# Patient Record
Sex: FEMALE | Employment: FULL TIME | URBAN - METROPOLITAN AREA
[De-identification: names, ages, dates, MRNs, and addresses within clinical notes are randomized per-mention and may not be internally consistent; named-entity substitution may affect disease eponyms.]

---

## 2023-05-22 ENCOUNTER — TELEPHONE (OUTPATIENT)
Dept: ENT CLINIC | Age: 33
End: 2023-05-22

## 2023-05-22 NOTE — TELEPHONE ENCOUNTER
Patients  calls into the office very upset that his wifes' appointment today was cancelled and previous appointment rescheduled. He states that no one called him or his wife to advise that this appointment was going to be rescheduled. He states that his wife came to the office today and this was the first they had heard of it. He states that he does not wish to reschedule his appointments with this office. Gave him the number for the [facility] location so that he may call. He would appreciate a return call from [address] to discuss this.

## 2023-05-25 NOTE — TELEPHONE ENCOUNTER
I called and spoke to Julian Mohr. She states that she did not receive a call to cancel or reschedule her last appointment. The staff noted that they had called her number twice to reschedule. I apologized for the miscommunication. She stated that she is seeing another ENT.

## 2023-06-12 DIAGNOSIS — H91.90 HEARING LOSS, UNSPECIFIED HEARING LOSS TYPE, UNSPECIFIED LATERALITY: Primary | ICD-10-CM

## 2024-01-08 ENCOUNTER — PROCEDURE VISIT (OUTPATIENT)
Dept: ENT CLINIC | Age: 34
End: 2024-01-08
Payer: COMMERCIAL

## 2024-01-08 VITALS
WEIGHT: 133.4 LBS | BODY MASS INDEX: 22.77 KG/M2 | TEMPERATURE: 97.1 F | DIASTOLIC BLOOD PRESSURE: 69 MMHG | SYSTOLIC BLOOD PRESSURE: 116 MMHG | HEART RATE: 82 BPM | HEIGHT: 64 IN

## 2024-01-08 DIAGNOSIS — L91.0 KELOID OF SKIN: Primary | ICD-10-CM

## 2024-01-08 DIAGNOSIS — D23.22: ICD-10-CM

## 2024-01-08 PROCEDURE — 11900 INJECT SKIN LESIONS </W 7: CPT | Performed by: OTOLARYNGOLOGY

## 2024-01-08 PROCEDURE — 11442 EXC FACE-MM B9+MARG 1.1-2 CM: CPT | Performed by: OTOLARYNGOLOGY

## 2024-01-08 NOTE — PROGRESS NOTES
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    POSTOPERATIVE DIAGNOSIS: Same    PROCEDURE: Excision keloid of the helix of the left external ear.  Triamcinolone injection into the operative site.    SURGEON:  Jeaneth Pocatello    ANAESTHESIA: Lidocaine 1% with epinephrine 1: 100,000.    FINDINGS: The left ear was prepped with Betadine.  The skin of the helix of the left external ear was injected with local anesthetic.  The keloid was removed using a 15 blade as well as the scissors and an Adson's tissue forceps.  The keloid size was 1.8 cm.A small amount of keloid tissue was left present on the helix.  The excision site was injected with triamcinolone 40 mg/mL.  No sutures were placed.    FOLLOW UP: 1 month.

## 2024-01-22 ENCOUNTER — OFFICE VISIT (OUTPATIENT)
Dept: ENT CLINIC | Age: 34
End: 2024-01-22

## 2024-01-22 VITALS
TEMPERATURE: 97.5 F | HEIGHT: 64 IN | DIASTOLIC BLOOD PRESSURE: 64 MMHG | HEART RATE: 87 BPM | RESPIRATION RATE: 16 BRPM | SYSTOLIC BLOOD PRESSURE: 122 MMHG | BODY MASS INDEX: 23.22 KG/M2 | WEIGHT: 136 LBS

## 2024-01-22 DIAGNOSIS — L91.0 KELOID OF SKIN: Primary | ICD-10-CM

## 2024-01-22 DIAGNOSIS — D23.22: ICD-10-CM

## 2024-01-22 PROCEDURE — 99024 POSTOP FOLLOW-UP VISIT: CPT | Performed by: OTOLARYNGOLOGY

## 2024-01-22 NOTE — PROGRESS NOTES
2 weeks following excision keloid of the helix of the left ear.  I left a small amount of keloid present and injected with triamcinolone.  The skin has healed well by secondary intention and there is minimal residual discomfort or swelling.  The patient will return in 2 weeks for another triamcinolone injection.

## 2024-02-07 ENCOUNTER — OFFICE VISIT (OUTPATIENT)
Dept: ENT CLINIC | Age: 34
End: 2024-02-07
Payer: COMMERCIAL

## 2024-02-07 VITALS
TEMPERATURE: 97.8 F | DIASTOLIC BLOOD PRESSURE: 60 MMHG | BODY MASS INDEX: 22.88 KG/M2 | HEIGHT: 64 IN | SYSTOLIC BLOOD PRESSURE: 107 MMHG | WEIGHT: 134 LBS | HEART RATE: 73 BPM

## 2024-02-07 DIAGNOSIS — L91.0 KELOID OF SKIN: Primary | ICD-10-CM

## 2024-02-07 PROCEDURE — 99212 OFFICE O/P EST SF 10 MIN: CPT | Performed by: OTOLARYNGOLOGY

## 2024-02-07 NOTE — PROGRESS NOTES
1 month following excision of keloid of the helix of the left ear.  The ear is completely healed, the patient's pain is resolved.  Follow-up: 3 months.

## 2024-05-06 ENCOUNTER — OFFICE VISIT (OUTPATIENT)
Dept: ENT CLINIC | Age: 34
End: 2024-05-06
Payer: COMMERCIAL

## 2024-05-06 VITALS
TEMPERATURE: 97.5 F | WEIGHT: 135.4 LBS | HEIGHT: 64 IN | DIASTOLIC BLOOD PRESSURE: 61 MMHG | SYSTOLIC BLOOD PRESSURE: 105 MMHG | HEART RATE: 64 BPM | BODY MASS INDEX: 23.12 KG/M2

## 2024-05-06 DIAGNOSIS — L91.0 KELOID OF SKIN: Primary | ICD-10-CM

## 2024-05-06 DIAGNOSIS — D23.22: ICD-10-CM

## 2024-05-06 PROCEDURE — 99024 POSTOP FOLLOW-UP VISIT: CPT | Performed by: OTOLARYNGOLOGY

## 2024-05-06 PROCEDURE — 11900 INJECT SKIN LESIONS </W 7: CPT | Performed by: OTOLARYNGOLOGY

## 2024-05-06 RX ORDER — TRIAMCINOLONE ACETONIDE 40 MG/ML
40 INJECTION, SUSPENSION INTRA-ARTICULAR; INTRAMUSCULAR ONCE
Status: COMPLETED | OUTPATIENT
Start: 2024-05-06 | End: 2024-05-06

## 2024-05-06 RX ADMIN — TRIAMCINOLONE ACETONIDE 40 MG: 40 INJECTION, SUSPENSION INTRA-ARTICULAR; INTRAMUSCULAR at 11:33

## 2024-05-06 NOTE — PROGRESS NOTES
Excision keloid of the superior helix of the left external ear a little over 3 months ago.  Last seen 1 month postop.  The skin and has had healed well and the patient had no pain.  She continues to do well.  The skin is healed and there is no pain.  I detect a slight amount of keloid regrowth and after discussion with patient we have agreed to inject triamcinolone into the keloid.  1 cm of Kenalog 40 mg/mL injected into the keloid.  Follow-up: 3 months.

## 2024-08-20 ENCOUNTER — OFFICE VISIT (OUTPATIENT)
Dept: OBGYN CLINIC | Age: 34
End: 2024-08-20
Payer: COMMERCIAL

## 2024-08-20 VITALS
SYSTOLIC BLOOD PRESSURE: 120 MMHG | WEIGHT: 128.6 LBS | HEART RATE: 101 BPM | DIASTOLIC BLOOD PRESSURE: 70 MMHG | BODY MASS INDEX: 22.07 KG/M2

## 2024-08-20 DIAGNOSIS — F12.90 MARIJUANA USE: ICD-10-CM

## 2024-08-20 DIAGNOSIS — Z32.01 POSITIVE PREGNANCY TEST: ICD-10-CM

## 2024-08-20 DIAGNOSIS — N91.2 AMENORRHEA: ICD-10-CM

## 2024-08-20 DIAGNOSIS — R11.0 NAUSEA: ICD-10-CM

## 2024-08-20 DIAGNOSIS — Z01.419 WOMEN'S ANNUAL ROUTINE GYNECOLOGICAL EXAMINATION: Primary | ICD-10-CM

## 2024-08-20 LAB
CONTROL: ABNORMAL
PREGNANCY TEST URINE, POC: POSITIVE

## 2024-08-20 PROCEDURE — 99385 PREV VISIT NEW AGE 18-39: CPT | Performed by: OBSTETRICS & GYNECOLOGY

## 2024-08-20 PROCEDURE — 81025 URINE PREGNANCY TEST: CPT | Performed by: OBSTETRICS & GYNECOLOGY

## 2024-08-20 ASSESSMENT — ENCOUNTER SYMPTOMS
NAUSEA: 1
PHOTOPHOBIA: 0
ABDOMINAL PAIN: 0
COLOR CHANGE: 0
SHORTNESS OF BREATH: 0
VOMITING: 0

## 2024-08-20 NOTE — PROGRESS NOTES
[facility]   Annual Exam     CC:   Chief Complaint   Patient presents with    New Patient    Amenorrhea     LMP 24  Chills all the time.        HPI:  34 y.o.  presents for her gynecologic exam.   New to office. She complains of Amenorrhea.   Patient's last menstrual period was 2024.  Has had a (+) home pregnancy test.   Denies any VB / Cramping since that time.   Some nausea/vomiting } declined meds for now.   Doing well prenatal vitamin.   Medical Hx significant for None. Pt admits to hx of opiate abuse. Uses THC now. Has quit smoking. Denies Hx of GYN surgeries.   Previous pregnancies: G1: Normal /  + Episiotomy, G2: Normal / , Did have hx of THC. G3: elective AB.       Screening:  Last pap smear: thinks last yr, normal   History of abnormal pap smears: denies    Denies   STI History: Denies      Menstruation Hx:   Menarche: 11-14   Cycle: 4-7d / 30d   Flow: heavy then normal   Pain: Y -- Ibuprofen + THC helps + HP      Review of Systems:   Review of Systems   Constitutional:  Negative for activity change, appetite change and fatigue.   Eyes:  Negative for photophobia and visual disturbance.   Respiratory:  Negative for shortness of breath.    Cardiovascular:  Negative for chest pain, palpitations and leg swelling.   Gastrointestinal:  Positive for nausea. Negative for abdominal pain and vomiting.   Genitourinary:  Negative for difficulty urinating.        (+) absence of Menses   (+) breast tenderness    Skin:  Negative for color change.   Neurological:  Negative for dizziness and numbness.   Hematological:  Negative for adenopathy. Does not bruise/bleed easily.   Psychiatric/Behavioral:  Negative for behavioral problems. The patient is not nervous/anxious.        Primary Care Physician: Cammy Elias DO    Obstetric History  OB History    Para Term  AB Living   3       1 2   SAB IAB Ectopic Molar Multiple Live Births             2      # Outcome Date GA Lbr Jerod/2nd

## 2024-08-21 LAB
BACTERIA UR CULT: NORMAL
BACTERIA URNS QL MICRO: ABNORMAL /HPF
BILIRUB UR QL STRIP.AUTO: NEGATIVE
CLARITY UR: CLEAR
COLOR UR: YELLOW
EPI CELLS #/AREA URNS AUTO: 6 /HPF (ref 0–5)
GLUCOSE UR STRIP.AUTO-MCNC: NEGATIVE MG/DL
HGB UR QL STRIP.AUTO: NEGATIVE
HYALINE CASTS #/AREA URNS AUTO: 0 /LPF (ref 0–8)
KETONES UR STRIP.AUTO-MCNC: NEGATIVE MG/DL
LEUKOCYTE ESTERASE UR QL STRIP.AUTO: NEGATIVE
NITRITE UR QL STRIP.AUTO: NEGATIVE
PH UR STRIP.AUTO: 5.5 [PH] (ref 5–8)
PROT UR STRIP.AUTO-MCNC: NEGATIVE MG/DL
RBC CLUMPS #/AREA URNS AUTO: 1 /HPF (ref 0–4)
SP GR UR STRIP.AUTO: 1.02 (ref 1–1.03)
UA DIPSTICK W REFLEX MICRO PNL UR: ABNORMAL
URN SPEC COLLECT METH UR: ABNORMAL
UROBILINOGEN UR STRIP-ACNC: 0.2 E.U./DL
WBC #/AREA URNS AUTO: 2 /HPF (ref 0–5)

## 2024-09-20 ENCOUNTER — INITIAL PRENATAL (OUTPATIENT)
Dept: OBGYN CLINIC | Age: 34
End: 2024-09-20
Payer: COMMERCIAL

## 2024-09-20 VITALS
WEIGHT: 132.8 LBS | DIASTOLIC BLOOD PRESSURE: 60 MMHG | SYSTOLIC BLOOD PRESSURE: 108 MMHG | BODY MASS INDEX: 22.8 KG/M2 | HEART RATE: 81 BPM

## 2024-09-20 DIAGNOSIS — O99.320 MARIJUANA USE DURING PREGNANCY: ICD-10-CM

## 2024-09-20 DIAGNOSIS — Z34.81 PRENATAL CARE, SUBSEQUENT PREGNANCY IN FIRST TRIMESTER: Primary | ICD-10-CM

## 2024-09-20 DIAGNOSIS — O21.9 NAUSEA AND VOMITING IN PREGNANCY: ICD-10-CM

## 2024-09-20 DIAGNOSIS — F12.90 MARIJUANA USE DURING PREGNANCY: ICD-10-CM

## 2024-09-20 DIAGNOSIS — Z87.891 RECENTLY QUIT USING TOBACCO: ICD-10-CM

## 2024-09-20 DIAGNOSIS — Z91.89 AT RISK FOR ABUSE OF OPIATES: ICD-10-CM

## 2024-09-20 LAB
HEP B, EXTERNAL RESULT: NEGATIVE
HIV, EXTERNAL RESULT: NEGATIVE
RUBELLA TITER, EXTERNAL RESULT: NORMAL

## 2024-09-20 PROCEDURE — 36415 COLL VENOUS BLD VENIPUNCTURE: CPT | Performed by: OBSTETRICS & GYNECOLOGY

## 2024-09-20 PROCEDURE — 0502F SUBSEQUENT PRENATAL CARE: CPT | Performed by: OBSTETRICS & GYNECOLOGY

## 2024-09-21 LAB
ABO + RH BLD: NORMAL
AMPHETAMINES UR QL SCN>1000 NG/ML: ABNORMAL
BARBITURATES UR QL SCN>200 NG/ML: ABNORMAL
BASOPHILS # BLD: 0.1 K/UL (ref 0–0.2)
BASOPHILS NFR BLD: 0.6 %
BENZODIAZ UR QL SCN>200 NG/ML: ABNORMAL
BLD GP AB SCN SERPL QL: NORMAL
BUPRENORPHINE+NOR UR QL SCN: ABNORMAL
CANNABINOIDS UR QL SCN>50 NG/ML: POSITIVE
COCAINE UR QL SCN: ABNORMAL
DEPRECATED RDW RBC AUTO: 15.3 % (ref 12.4–15.4)
DRUG SCREEN COMMENT UR-IMP: ABNORMAL
EOSINOPHIL # BLD: 0.7 K/UL (ref 0–0.6)
EOSINOPHIL NFR BLD: 7.4 %
FENTANYL SCREEN, URINE: ABNORMAL
HBV SURFACE AG SERPL QL IA: NORMAL
HCT VFR BLD AUTO: 36.6 % (ref 36–48)
HGB BLD-MCNC: 12.3 G/DL (ref 12–16)
LYMPHOCYTES # BLD: 1.9 K/UL (ref 1–5.1)
LYMPHOCYTES NFR BLD: 19.2 %
MCH RBC QN AUTO: 29.9 PG (ref 26–34)
MCHC RBC AUTO-ENTMCNC: 33.6 G/DL (ref 31–36)
MCV RBC AUTO: 89 FL (ref 80–100)
METHADONE UR QL SCN>300 NG/ML: ABNORMAL
MONOCYTES # BLD: 0.4 K/UL (ref 0–1.3)
MONOCYTES NFR BLD: 4.4 %
NEUTROPHILS # BLD: 6.8 K/UL (ref 1.7–7.7)
NEUTROPHILS NFR BLD: 68.4 %
OPIATES UR QL SCN>300 NG/ML: ABNORMAL
OXYCODONE UR QL SCN: ABNORMAL
PCP UR QL SCN>25 NG/ML: ABNORMAL
PH UR STRIP: 6 [PH]
PLATELET # BLD AUTO: 282 K/UL (ref 135–450)
PMV BLD AUTO: 8.7 FL (ref 5–10.5)
RBC # BLD AUTO: 4.11 M/UL (ref 4–5.2)
RUBV IGG SERPL IA-ACNC: 99.5 IU/ML
TSH SERPL DL<=0.005 MIU/L-ACNC: 1.19 UIU/ML (ref 0.27–4.2)
WBC # BLD AUTO: 9.9 K/UL (ref 4–11)

## 2024-09-22 LAB — VZV IGG SER QL IA: NORMAL

## 2024-09-23 LAB
HCV AB S/CO SERPL IA: 0.14 IV
HCV AB SERPL QL IA: NEGATIVE

## 2024-09-24 LAB
HIV 1+2 AB+HIV1 P24 AG SERPL QL IA: NORMAL
HIV 2 AB SERPL QL IA: NORMAL
HIV1 AB SERPL QL IA: NORMAL
HIV1 P24 AG SERPL QL IA: NORMAL
T PALLIDUM IGG SER QL IF: NON REACTIVE

## 2024-09-30 PROBLEM — Z34.81 PRENATAL CARE, SUBSEQUENT PREGNANCY IN FIRST TRIMESTER: Status: ACTIVE | Noted: 2024-09-30

## 2024-09-30 PROBLEM — Z91.89 AT RISK FOR ABUSE OF OPIATES: Status: ACTIVE | Noted: 2024-09-30

## 2024-09-30 PROBLEM — Z87.891 RECENTLY QUIT USING TOBACCO: Status: ACTIVE | Noted: 2024-09-30

## 2024-10-18 ENCOUNTER — ROUTINE PRENATAL (OUTPATIENT)
Dept: OBGYN CLINIC | Age: 34
End: 2024-10-18

## 2024-10-18 VITALS
BODY MASS INDEX: 23.72 KG/M2 | OXYGEN SATURATION: 99 % | WEIGHT: 138.2 LBS | DIASTOLIC BLOOD PRESSURE: 64 MMHG | HEART RATE: 58 BPM | SYSTOLIC BLOOD PRESSURE: 118 MMHG

## 2024-10-18 DIAGNOSIS — F12.90 MARIJUANA USE DURING PREGNANCY: ICD-10-CM

## 2024-10-18 DIAGNOSIS — Z34.82 PRENATAL CARE, SUBSEQUENT PREGNANCY IN SECOND TRIMESTER: Primary | ICD-10-CM

## 2024-10-18 DIAGNOSIS — Z87.891 RECENTLY QUIT USING TOBACCO: ICD-10-CM

## 2024-10-18 DIAGNOSIS — O99.320 MARIJUANA USE DURING PREGNANCY: ICD-10-CM

## 2024-10-18 DIAGNOSIS — O21.9 NAUSEA AND VOMITING IN PREGNANCY: ICD-10-CM

## 2024-10-18 PROCEDURE — 0502F SUBSEQUENT PRENATAL CARE: CPT | Performed by: OBSTETRICS & GYNECOLOGY

## 2024-10-18 NOTE — PROGRESS NOTES
34 y.o.  at 16w4d EGA Estimated Date of Delivery: 3/31/25 here for NETTIE Visit:     Pt seen and examined.   Reviewed all questions and concerns   Denies VB, LOF, CTX. Denies FM yet.   Denies fevers / chills / chest pain / shortness of breath.   Denies HA, changes with vision, RUQ pain, edema.   MWQ reviewed - doing well no concerns.     Objective:  /64 (Site: Right Upper Arm, Position: Sitting, Cuff Size: Medium Adult)   Pulse 58   Wt 62.7 kg (138 lb 3.2 oz)   LMP 2024   SpO2 99%   BMI 23.72 kg/m²   Gen: AO, NAD  Abd: Soft, NT, gravid    FHT: 145 bpm  Ext: Mild LE edema  OMM: Increased lumbar lordosis    Assessment/Plan:   Diagnosis Orders   1. Prenatal care, subsequent pregnancy in second trimester        2. Marijuana use during pregnancy        3. Nausea and vomiting in pregnancy        4. Recently quit using tobacco             Prenatal Care, FIRST pregnancy   Reassuring fetal / maternal status today  Aneuploidy / Carrier Screen:  Low Risk, Female   AFP: declined    PNL: A(+)/ab(-), RI, HepBnr, HepCnr, HIVnr, RPRnr, Varicella Imm, TSH 1.19, Hgb 12.3, Plt 282, UDS (+) THC, UCx neg, GCCT NEED      Anatomy:   28 wk: GCT  Hgb Plt   Tdap: at 28 wks    GBS: 35-37 wks   Birth Plan:    Breastfeeding Education:     Marijuana Use   Currently helping nausea   Recommend quitting in pregnancy   Will retest on LD at delivery, may trigger social work consult   Plan for Growth US around 36 wks     Hx Opiate use   Has been clean for several years, not on maintenance medication   No concerns currently  UDS only + for THC     Follow Up  Return OB precautions reviewed   Return in about 4 weeks (around 11/15/2024) for Return OB visit, Ultrasound.    Izabella Barcenas DO

## 2024-10-29 PROBLEM — O99.320 MARIJUANA USE DURING PREGNANCY: Status: ACTIVE | Noted: 2024-08-20

## 2024-10-29 PROBLEM — O21.9 NAUSEA AND VOMITING IN PREGNANCY: Status: ACTIVE | Noted: 2024-10-29

## 2024-11-12 ENCOUNTER — ROUTINE PRENATAL (OUTPATIENT)
Dept: OBGYN CLINIC | Age: 34
End: 2024-11-12

## 2024-11-12 VITALS
WEIGHT: 144.8 LBS | DIASTOLIC BLOOD PRESSURE: 60 MMHG | SYSTOLIC BLOOD PRESSURE: 110 MMHG | BODY MASS INDEX: 24.85 KG/M2 | HEART RATE: 85 BPM

## 2024-11-12 DIAGNOSIS — Z36.9 ENCOUNTER FOR FETAL ULTRASOUND: ICD-10-CM

## 2024-11-12 DIAGNOSIS — Z91.89 AT RISK FOR ABUSE OF OPIATES: ICD-10-CM

## 2024-11-12 DIAGNOSIS — F12.90 MARIJUANA USE DURING PREGNANCY: ICD-10-CM

## 2024-11-12 DIAGNOSIS — N89.8 VAGINAL DISCHARGE DURING PREGNANCY IN SECOND TRIMESTER: ICD-10-CM

## 2024-11-12 DIAGNOSIS — Z87.891 RECENTLY QUIT USING TOBACCO: ICD-10-CM

## 2024-11-12 DIAGNOSIS — O26.892 VAGINAL DISCHARGE DURING PREGNANCY IN SECOND TRIMESTER: ICD-10-CM

## 2024-11-12 DIAGNOSIS — O21.9 NAUSEA AND VOMITING IN PREGNANCY: ICD-10-CM

## 2024-11-12 DIAGNOSIS — Z34.82 PRENATAL CARE, SUBSEQUENT PREGNANCY IN SECOND TRIMESTER: Primary | ICD-10-CM

## 2024-11-12 DIAGNOSIS — O99.320 MARIJUANA USE DURING PREGNANCY: ICD-10-CM

## 2024-11-12 PROCEDURE — 0502F SUBSEQUENT PRENATAL CARE: CPT | Performed by: OBSTETRICS & GYNECOLOGY

## 2024-11-12 RX ORDER — TERCONAZOLE 4 MG/G
CREAM VAGINAL
Qty: 45 G | Refills: 0 | Status: SHIPPED | OUTPATIENT
Start: 2024-11-12 | End: 2024-11-19

## 2024-11-12 NOTE — PROGRESS NOTES
34 y.o.  at 20w1d EGA Estimated Date of Delivery: 3/31/25 here for NETTIE Visit:     Pt seen and examined.   Does complain of some vaginal discharge, declines examination, would like to try terazole first, OK with rx   Reviewed all questions and concerns   Denies VB, LOF, CTX. Admits to normal fetal movement.   Denies fevers / chills / chest pain / shortness of breath.   Denies HA, changes with vision, RUQ pain, edema.   MWQ reviewed - doing well no concerns.     Pregnancy is complicated by 1) THC use, 2) Hx of opiate abuse, 3) nausea / emesis (improving).     Objective:  /60 (Site: Right Upper Arm, Position: Sitting, Cuff Size: Medium Adult)   Pulse 85   Wt 65.7 kg (144 lb 12.8 oz)   LMP 2024   BMI 24.85 kg/m²   Gen: AO, NAD  Abd: Soft, NT, gravid   Ext: Mild LE edema  OMM: Increased lumbar lordosis    Images:  OBSTETRIC ULTRASOUND -- SECOND TRIMESTER     DATE:  2024     PHYSICIAN: FLAKITO Barcenas D.O.     SONOGRAPHER: FREDY Kaminski MS     INDICATION:  Second trimester, Anatomical screening     TYPE OF SCAN: vaginal, abdominal 3.5 MHz 5MHz     FINDINGS:       A single viable intrauterine pregnancy is noted in cephalic presentation. Cardiac and somatic activity are noted.     The following values were obtained:              Fetal heart rate                                               151bpm              BPD                                                                 4.93cm                        79.8 %              Head Circumference                                       18.87cm                      84.4 %               Abdominal Circumference                              16.35cm                      82.8 %              Femur Length                                                  3.38cm                        59.0 %              Humerus Length                                             3.21cm                        75.4 %              Cerebellum

## 2024-11-18 PROBLEM — Z34.82 PRENATAL CARE, SUBSEQUENT PREGNANCY IN SECOND TRIMESTER: Status: ACTIVE | Noted: 2024-11-18

## 2024-11-18 PROBLEM — Z36.9 ENCOUNTER FOR FETAL ULTRASOUND: Status: ACTIVE | Noted: 2024-11-18

## 2024-11-18 PROBLEM — N89.8 VAGINAL DISCHARGE DURING PREGNANCY IN SECOND TRIMESTER: Status: ACTIVE | Noted: 2024-11-18

## 2024-11-18 PROBLEM — O26.892 VAGINAL DISCHARGE DURING PREGNANCY IN SECOND TRIMESTER: Status: ACTIVE | Noted: 2024-11-18

## 2024-12-10 ENCOUNTER — ROUTINE PRENATAL (OUTPATIENT)
Dept: OBGYN CLINIC | Age: 34
End: 2024-12-10

## 2024-12-10 VITALS
HEIGHT: 64 IN | TEMPERATURE: 97.8 F | SYSTOLIC BLOOD PRESSURE: 112 MMHG | WEIGHT: 147.4 LBS | BODY MASS INDEX: 25.16 KG/M2 | HEART RATE: 97 BPM | DIASTOLIC BLOOD PRESSURE: 64 MMHG | OXYGEN SATURATION: 98 %

## 2024-12-10 DIAGNOSIS — F12.90 MARIJUANA USE DURING PREGNANCY: ICD-10-CM

## 2024-12-10 DIAGNOSIS — Z34.82 PRENATAL CARE, SUBSEQUENT PREGNANCY IN SECOND TRIMESTER: Primary | ICD-10-CM

## 2024-12-10 DIAGNOSIS — O99.320 MARIJUANA USE DURING PREGNANCY: ICD-10-CM

## 2024-12-10 DIAGNOSIS — Z91.89 AT RISK FOR ABUSE OF OPIATES: ICD-10-CM

## 2024-12-10 PROCEDURE — 0502F SUBSEQUENT PRENATAL CARE: CPT | Performed by: OBSTETRICS & GYNECOLOGY

## 2024-12-10 NOTE — PROGRESS NOTES
Return OB Office Visit    CC:   Chief Complaint   Patient presents with    Routine Prenatal Visit       HPI:  Pt seen and examined. No concerns/complaints. Denies VB, LOF, ctx. +FM. Some cramps today, only lasted about 10-15 minutes.     Objective:  /64   Pulse 97   Temp 97.8 °F (36.6 °C) (Temporal)   Ht 1.626 m (5' 4\")   Wt 66.9 kg (147 lb 6.4 oz)   LMP 2024   SpO2 98%   BMI 25.30 kg/m²   Gen: AO, NAD  Abd: Soft, NT  FHT: 150  FH: 24cm, unk by Le[place]olds    Assessment/Plan:  34 y.o.  at 24w1d (Estimated Date of Delivery: 3/31/25) presents for NETTIE appointment:      Diagnosis Orders   1. Prenatal care, subsequent pregnancy in second trimester        2. Marijuana use during pregnancy        3. At risk for abuse of opiates          Doing well today, routine care  - FWB reassuring on doptones today  - GTT/CBC, Tdap next visit  - rec THC cessation, repeat on L&D  - h/o opiate use, clean for several years, no maintenance medications, no concerns today    Dispo: RTC in 4 weeks  Tabby Lagos MD

## 2025-01-07 ENCOUNTER — ROUTINE PRENATAL (OUTPATIENT)
Dept: OBGYN CLINIC | Age: 35
End: 2025-01-07
Payer: COMMERCIAL

## 2025-01-07 VITALS
WEIGHT: 153 LBS | DIASTOLIC BLOOD PRESSURE: 79 MMHG | SYSTOLIC BLOOD PRESSURE: 118 MMHG | BODY MASS INDEX: 26.26 KG/M2 | HEART RATE: 85 BPM

## 2025-01-07 DIAGNOSIS — F12.90 MARIJUANA USE DURING PREGNANCY: ICD-10-CM

## 2025-01-07 DIAGNOSIS — Z34.83 PRENATAL CARE, SUBSEQUENT PREGNANCY IN THIRD TRIMESTER: Primary | ICD-10-CM

## 2025-01-07 DIAGNOSIS — Z23 NEED FOR PERTUSSIS VACCINATION: ICD-10-CM

## 2025-01-07 DIAGNOSIS — O99.320 MARIJUANA USE DURING PREGNANCY: ICD-10-CM

## 2025-01-07 LAB
BASOPHILS # BLD: 0.1 K/UL (ref 0–0.2)
BASOPHILS NFR BLD: 0.5 %
DEPRECATED RDW RBC AUTO: 13.6 % (ref 12.4–15.4)
EOSINOPHIL # BLD: 0.4 K/UL (ref 0–0.6)
EOSINOPHIL NFR BLD: 3.8 %
HCT VFR BLD AUTO: 35.2 % (ref 36–48)
HGB BLD-MCNC: 11.8 G/DL (ref 12–16)
LYMPHOCYTES # BLD: 1.6 K/UL (ref 1–5.1)
LYMPHOCYTES NFR BLD: 16 %
MCH RBC QN AUTO: 30.5 PG (ref 26–34)
MCHC RBC AUTO-ENTMCNC: 33.5 G/DL (ref 31–36)
MCV RBC AUTO: 91 FL (ref 80–100)
MONOCYTES # BLD: 0.6 K/UL (ref 0–1.3)
MONOCYTES NFR BLD: 5.5 %
NEUTROPHILS # BLD: 7.5 K/UL (ref 1.7–7.7)
NEUTROPHILS NFR BLD: 74.2 %
PLATELET # BLD AUTO: 277 K/UL (ref 135–450)
PMV BLD AUTO: 7.9 FL (ref 5–10.5)
RBC # BLD AUTO: 3.87 M/UL (ref 4–5.2)
WBC # BLD AUTO: 10.1 K/UL (ref 4–11)

## 2025-01-07 PROCEDURE — 36415 COLL VENOUS BLD VENIPUNCTURE: CPT | Performed by: OBSTETRICS & GYNECOLOGY

## 2025-01-07 PROCEDURE — 90471 IMMUNIZATION ADMIN: CPT | Performed by: OBSTETRICS & GYNECOLOGY

## 2025-01-07 PROCEDURE — 0502F SUBSEQUENT PRENATAL CARE: CPT | Performed by: OBSTETRICS & GYNECOLOGY

## 2025-01-07 PROCEDURE — 90715 TDAP VACCINE 7 YRS/> IM: CPT | Performed by: OBSTETRICS & GYNECOLOGY

## 2025-01-07 NOTE — PROGRESS NOTES
34 y.o.  at 28w1d EGA Estimated Date of Delivery: 3/31/25 here for NETTIE:     Pt seen and examined. No concerns/complaints.  Denies VB, LOF, CTX. Endorses (+) FM. Denies fevers / chills / chest pain / shortness of breath.   Denies HA, changes with vision, RUQ pain, edema.     Prior  x 2 both at [facility], 1st was forcep delivery     Objective:  /79   Pulse 85   Wt 69.4 kg (153 lb)   LMP 2024   BMI 26.26 kg/m²   Gen: AO, NAD  Abd: Soft, NT, gravid   Ext: Mild LE edema    Assessment/Plan:   Diagnosis Orders   1. Prenatal care, subsequent pregnancy in third trimester        2. Marijuana use during pregnancy           -gct and cbc today   - tdap today   - rtc in 2 weeks   - ptl precautions   - reassuring maternal / fetal status     Follow Up  Return OB precautions reviewed   No follow-ups on file.    Approximately 10 minutes spent in room counseling patient on condition and coordination of care with over 50% in direct face to face counseling.     Ally Reyes, DO

## 2025-01-08 LAB — GLUCOSE 1H P 50 G GLC PO SERPL-MCNC: 115 MG/DL

## 2025-01-24 ENCOUNTER — ROUTINE PRENATAL (OUTPATIENT)
Dept: OBGYN CLINIC | Age: 35
End: 2025-01-24

## 2025-01-24 VITALS
DIASTOLIC BLOOD PRESSURE: 66 MMHG | TEMPERATURE: 98.2 F | BODY MASS INDEX: 26.88 KG/M2 | HEART RATE: 91 BPM | SYSTOLIC BLOOD PRESSURE: 108 MMHG | WEIGHT: 156.6 LBS

## 2025-01-24 DIAGNOSIS — Z34.83 PRENATAL CARE, SUBSEQUENT PREGNANCY IN THIRD TRIMESTER: Primary | ICD-10-CM

## 2025-01-24 DIAGNOSIS — F12.90 MARIJUANA USE DURING PREGNANCY: ICD-10-CM

## 2025-01-24 DIAGNOSIS — O21.9 NAUSEA AND VOMITING IN PREGNANCY: ICD-10-CM

## 2025-01-24 DIAGNOSIS — O99.320 MARIJUANA USE DURING PREGNANCY: ICD-10-CM

## 2025-01-24 DIAGNOSIS — Z87.891 RECENTLY QUIT USING TOBACCO: ICD-10-CM

## 2025-01-24 DIAGNOSIS — Z91.89 AT RISK FOR ABUSE OF OPIATES: ICD-10-CM

## 2025-01-24 PROBLEM — Z34.81 PRENATAL CARE, SUBSEQUENT PREGNANCY IN FIRST TRIMESTER: Status: RESOLVED | Noted: 2024-09-30 | Resolved: 2025-01-24

## 2025-01-24 PROBLEM — Z36.9 ENCOUNTER FOR FETAL ULTRASOUND: Status: RESOLVED | Noted: 2024-11-18 | Resolved: 2025-01-24

## 2025-01-24 NOTE — PROGRESS NOTES
Return OB Office Visit    CC:   Chief Complaint   Patient presents with    Routine Prenatal Visit       HPI:  Patient seen and examined. Doing well today without complaints.     Denies VB, LOF.  Reports occasional contractions.  +FM.  Denies headaches, vision changes, RUQ pain, increased LE edema.  Denies chest pain, shortness of breath, fever, chills.  Nausea and vomiting are managed with THC.     Review of Systems: The following ROS was otherwise negative, except as noted in the HPI: constitutional, HEENT, respiratory, cardiovascular, gastrointestinal, genitourinary, skin, musculoskeletal, neurological, psych    Objective:  /66   Pulse 91   Temp 98.2 °F (36.8 °C) (Infrared)   Wt 71 kg (156 lb 9.6 oz)   LMP 2024   BMI 26.88 kg/m²     Physical Exam  Vitals reviewed.   Constitutional:       General: She is not in acute distress.     Appearance: She is well-developed.   HENT:      Head: Normocephalic and atraumatic.   Eyes:      Conjunctiva/sclera: Conjunctivae normal.   Cardiovascular:      Rate and Rhythm: Normal rate.   Pulmonary:      Effort: Pulmonary effort is normal. No respiratory distress.   Abdominal:      General: There is no distension.      Palpations: Abdomen is soft.      Tenderness: There is no abdominal tenderness. There is no guarding or rebound.   Musculoskeletal:         General: No swelling.   Skin:     General: Skin is warm and dry.   Neurological:      Mental Status: She is alert and oriented to person, place, and time.   Psychiatric:         Mood and Affect: Mood normal.         Behavior: Behavior normal.         Thought Content: Thought content normal.         FHR: 135 bpm by doppler    Assessment/Plan:   Jennifer Fuchs is a 34 y.o.  at 30w4d who presents for routine OB visit    1. Prenatal care, subsequent pregnancy in third trimester  Reassuring fetal / maternal status today  Aneuploidy / Carrier Screen:  Low Risk, Female   AFP: declined    PNL: A(+)/ab(-), RI,

## 2025-02-06 ENCOUNTER — ROUTINE PRENATAL (OUTPATIENT)
Dept: OBGYN CLINIC | Age: 35
End: 2025-02-06

## 2025-02-06 VITALS
HEART RATE: 94 BPM | DIASTOLIC BLOOD PRESSURE: 80 MMHG | WEIGHT: 155.8 LBS | BODY MASS INDEX: 26.74 KG/M2 | OXYGEN SATURATION: 99 % | SYSTOLIC BLOOD PRESSURE: 118 MMHG

## 2025-02-06 DIAGNOSIS — Z87.891 RECENTLY QUIT USING TOBACCO: ICD-10-CM

## 2025-02-06 DIAGNOSIS — Z98.890 HISTORY OF EPISIOTOMY: ICD-10-CM

## 2025-02-06 DIAGNOSIS — O21.9 NAUSEA AND VOMITING IN PREGNANCY: ICD-10-CM

## 2025-02-06 DIAGNOSIS — O09.293: ICD-10-CM

## 2025-02-06 DIAGNOSIS — F12.90 MARIJUANA USE DURING PREGNANCY: ICD-10-CM

## 2025-02-06 DIAGNOSIS — Z91.89 AT RISK FOR ABUSE OF OPIATES: ICD-10-CM

## 2025-02-06 DIAGNOSIS — Z34.83 PRENATAL CARE, SUBSEQUENT PREGNANCY IN THIRD TRIMESTER: Primary | ICD-10-CM

## 2025-02-06 DIAGNOSIS — O99.320 MARIJUANA USE DURING PREGNANCY: ICD-10-CM

## 2025-02-06 PROCEDURE — 0502F SUBSEQUENT PRENATAL CARE: CPT | Performed by: OBSTETRICS & GYNECOLOGY

## 2025-02-06 NOTE — PROGRESS NOTES
34 y.o.  at 32w3d EGA Estimated Date of Delivery: 3/31/25 here for NETTIE Visit:     Pt seen and examined. Reviewed all questions and concerns   Denies VB, LOF, CTX. Admits to normal fetal movement.   Denies fevers / chills / chest pain / shortness of breath.   Denies HA, changes with vision, RUQ pain, edema.   MWQ reviewed - doing well no concerns.     Pregnancy is complicated by 1) THC use, 2) Hx of opiate abuse, 3) nausea / emesis (improving), 4) Prior  x 2 both at [facility], 1st was forcep delivery. 5) hx of 2 Episiotomies, 6) G2, baby had a hematoma on skull after delivery.      Objective:  /80 (Site: Right Upper Arm, Position: Sitting, Cuff Size: Medium Adult)   Pulse 94   Wt 70.7 kg (155 lb 12.8 oz)   LMP 2024   SpO2 99%   BMI 26.74 kg/m²   Gen: AO, NAD  Abd: Soft, NT, gravid     bpm    FH 32 cm   Ext: Mild LE edema  OMM: Increased lumbar lordosis    Assessment/Plan:   Diagnosis Orders   1. Prenatal care, subsequent pregnancy in third trimester        2. Nausea and vomiting in pregnancy        3. Marijuana use during pregnancy        4. Recently quit using tobacco        5. At risk for abuse of opiates        6. Hx of forceps delivery in prior pregnancy, currently pregnant, third trimester        7. History of episiotomy          Prenatal Care, Subsequent pregnancy   Reassuring fetal / maternal status today  Aneuploidy / Carrier Screen:  Low Risk, Female   AFP: declined    PNL: A(+)/ab(-), RI, HepBnr, HepCnr, HIVnr, RPRnr, Varicella Imm, TSH 1.19, Hgb 12.3, Plt 282, UDS (+) THC, UCx neg, GCCT NEED      Anatomy: 24 normal female anatomy, vertex position, posterior placenta, Transvaginal cervical length is 5.17cm with no funneling noted.  28 wk: GCT  Hgb Plt } nml   Tdap: completed at 28 wks   Flu: declined   RSV: declined   GBS: 35-37 wks   Birth Plan: hopeful for spontaneous labor, interested in epidural, would like to avoid episiotomy if able   Breastfeeding Education:

## 2025-02-20 ENCOUNTER — ROUTINE PRENATAL (OUTPATIENT)
Dept: OBGYN CLINIC | Age: 35
End: 2025-02-20

## 2025-02-20 VITALS
DIASTOLIC BLOOD PRESSURE: 64 MMHG | WEIGHT: 158 LBS | BODY MASS INDEX: 27.12 KG/M2 | HEART RATE: 85 BPM | SYSTOLIC BLOOD PRESSURE: 112 MMHG | OXYGEN SATURATION: 97 %

## 2025-02-20 DIAGNOSIS — F12.90 MARIJUANA USE DURING PREGNANCY: ICD-10-CM

## 2025-02-20 DIAGNOSIS — Z87.891 RECENTLY QUIT USING TOBACCO: ICD-10-CM

## 2025-02-20 DIAGNOSIS — O09.293: ICD-10-CM

## 2025-02-20 DIAGNOSIS — Z34.83 PRENATAL CARE, SUBSEQUENT PREGNANCY IN THIRD TRIMESTER: Primary | ICD-10-CM

## 2025-02-20 DIAGNOSIS — O21.9 NAUSEA AND VOMITING IN PREGNANCY: ICD-10-CM

## 2025-02-20 DIAGNOSIS — O99.320 MARIJUANA USE DURING PREGNANCY: ICD-10-CM

## 2025-02-20 PROCEDURE — 0502F SUBSEQUENT PRENATAL CARE: CPT | Performed by: OBSTETRICS & GYNECOLOGY

## 2025-02-20 NOTE — PROGRESS NOTES
Return OB Office Visit    CC:   Chief Complaint   Patient presents with    Routine Prenatal Visit       HPI:  Pt seen and examined. No concerns/complaints. Denies VB, LOF. +FM. Some contractions, having a lot of pelvic pain when she stands up. Has been having a lot of stress at home (sick cat, broken pipes, etc) so thinks it may be related.    Objective:  /64 (Site: Left Upper Arm, Position: Sitting, Cuff Size: Medium Adult)   Pulse 85   Wt 71.7 kg (158 lb)   LMP 2024   SpO2 97%   BMI 27.12 kg/m²   Gen: AO, NAD  Abd: Soft, NT  FHT: 151    Assessment/Plan:  34 y.o.  at 34w3d (Estimated Date of Delivery: 3/31/25) presents for NETTIE appointment:      Diagnosis Orders   1. Prenatal care, subsequent pregnancy in third trimester        2. Nausea and vomiting in pregnancy        3. Marijuana use during pregnancy        4. Recently quit using tobacco        5. Hx of forceps delivery in prior pregnancy, currently pregnant, third trimester          Doing well today, routine care  - FWB reassuring on doptones today  - return/labor precautions discussed  - SVE/GBS next visit    Dispo: RTC in 2 weeks  Tabby Lagos MD

## 2025-03-06 ENCOUNTER — ROUTINE PRENATAL (OUTPATIENT)
Dept: OBGYN CLINIC | Age: 35
End: 2025-03-06

## 2025-03-06 VITALS
HEART RATE: 109 BPM | OXYGEN SATURATION: 97 % | DIASTOLIC BLOOD PRESSURE: 64 MMHG | BODY MASS INDEX: 28.18 KG/M2 | SYSTOLIC BLOOD PRESSURE: 116 MMHG | WEIGHT: 164.2 LBS

## 2025-03-06 DIAGNOSIS — Z91.89 AT RISK FOR ABUSE OF OPIATES: ICD-10-CM

## 2025-03-06 DIAGNOSIS — Z87.891 RECENTLY QUIT USING TOBACCO: ICD-10-CM

## 2025-03-06 DIAGNOSIS — O99.320 MARIJUANA USE DURING PREGNANCY: ICD-10-CM

## 2025-03-06 DIAGNOSIS — O09.293: ICD-10-CM

## 2025-03-06 DIAGNOSIS — O21.9 NAUSEA AND VOMITING IN PREGNANCY: ICD-10-CM

## 2025-03-06 DIAGNOSIS — Z34.83 PRENATAL CARE, SUBSEQUENT PREGNANCY IN THIRD TRIMESTER: Primary | ICD-10-CM

## 2025-03-06 DIAGNOSIS — Z98.890 HISTORY OF EPISIOTOMY: ICD-10-CM

## 2025-03-06 DIAGNOSIS — F12.90 MARIJUANA USE DURING PREGNANCY: ICD-10-CM

## 2025-03-06 LAB — GBS, EXTERNAL RESULT: NEGATIVE

## 2025-03-09 ENCOUNTER — RESULTS FOLLOW-UP (OUTPATIENT)
Dept: OBGYN CLINIC | Age: 35
End: 2025-03-09

## 2025-03-09 LAB — GP B STREP SPEC QL CULT: NORMAL

## 2025-03-12 ENCOUNTER — ROUTINE PRENATAL (OUTPATIENT)
Dept: OBGYN CLINIC | Age: 35
End: 2025-03-12

## 2025-03-12 VITALS
WEIGHT: 160.8 LBS | DIASTOLIC BLOOD PRESSURE: 72 MMHG | BODY MASS INDEX: 27.6 KG/M2 | HEART RATE: 90 BPM | SYSTOLIC BLOOD PRESSURE: 134 MMHG

## 2025-03-12 DIAGNOSIS — Z34.83 PRENATAL CARE, SUBSEQUENT PREGNANCY IN THIRD TRIMESTER: Primary | ICD-10-CM

## 2025-03-12 DIAGNOSIS — Z91.89 AT RISK FOR ABUSE OF OPIATES: ICD-10-CM

## 2025-03-12 DIAGNOSIS — F12.90 MARIJUANA USE DURING PREGNANCY: ICD-10-CM

## 2025-03-12 DIAGNOSIS — O99.320 MARIJUANA USE DURING PREGNANCY: ICD-10-CM

## 2025-03-12 DIAGNOSIS — O26.892 VAGINAL DISCHARGE DURING PREGNANCY IN SECOND TRIMESTER: ICD-10-CM

## 2025-03-12 DIAGNOSIS — O09.293: ICD-10-CM

## 2025-03-12 DIAGNOSIS — N89.8 VAGINAL DISCHARGE DURING PREGNANCY IN SECOND TRIMESTER: ICD-10-CM

## 2025-03-12 DIAGNOSIS — Z98.890 HISTORY OF EPISIOTOMY: ICD-10-CM

## 2025-03-12 DIAGNOSIS — Z87.891 RECENTLY QUIT USING TOBACCO: ICD-10-CM

## 2025-03-12 PROCEDURE — 0502F SUBSEQUENT PRENATAL CARE: CPT | Performed by: OBSTETRICS & GYNECOLOGY

## 2025-03-19 NOTE — PROGRESS NOTES
33 y/o  female at 37 weeks 2 days gestation with EDC 3/31/25 presents for prenatal visit.    Patient is established with Dr. Barcenas   Pregnancy is complicated by THC use, history of opiate abuse, nausea/vomiting, history of forceps delivery, history of episiotomy x 2, and history of  with skull hematoma.   Denies vaginal bleeding, loss of fluid, contractions, and decreased fetal movement. Noted irregular contractions yesterday--resolved.   Denies headache, vision changes and RUQ pain.   Admits to baseline shortness of breath and some constipation  Denies fever, chills, nausea, vomiting, diarrhea, dysuria and hematuria.   Maternal Wellness Questionnaire reviewed--no concerns.   GBS negative  3/6/25: EFW: 3355 g, 87.8%, ELOINA 10.02 cm  250/-3 (1 cm at last visit), vertex       Diagnosis Orders   1. Prenatal care, subsequent pregnancy in third trimester        2. Marijuana use during pregnancy        3. Hx of forceps delivery in prior pregnancy, currently pregnant, third trimester        4. History of episiotomy        5. At risk for abuse of opiates        6. Recently quit using tobacco        7. Vaginal discharge during pregnancy in second trimester          Continue prenatal vitamins  Labor precautions, kick counts  Options for delivery reviewed--expectant management versus induction of labor.   Follow up prn and 1 week for prenatal visit.

## 2025-03-20 ENCOUNTER — ROUTINE PRENATAL (OUTPATIENT)
Dept: OBGYN CLINIC | Age: 35
End: 2025-03-20

## 2025-03-20 VITALS
SYSTOLIC BLOOD PRESSURE: 128 MMHG | DIASTOLIC BLOOD PRESSURE: 64 MMHG | BODY MASS INDEX: 27.7 KG/M2 | OXYGEN SATURATION: 97 % | WEIGHT: 161.4 LBS | HEART RATE: 90 BPM

## 2025-03-20 DIAGNOSIS — Z98.890 HISTORY OF EPISIOTOMY: ICD-10-CM

## 2025-03-20 DIAGNOSIS — O21.9 NAUSEA AND VOMITING IN PREGNANCY: ICD-10-CM

## 2025-03-20 DIAGNOSIS — Z87.891 RECENTLY QUIT USING TOBACCO: ICD-10-CM

## 2025-03-20 DIAGNOSIS — F12.90 MARIJUANA USE DURING PREGNANCY: ICD-10-CM

## 2025-03-20 DIAGNOSIS — O99.320 MARIJUANA USE DURING PREGNANCY: ICD-10-CM

## 2025-03-20 DIAGNOSIS — O09.293: ICD-10-CM

## 2025-03-20 DIAGNOSIS — Z34.83 PRENATAL CARE, SUBSEQUENT PREGNANCY IN THIRD TRIMESTER: Primary | ICD-10-CM

## 2025-03-20 DIAGNOSIS — Z91.89 AT RISK FOR ABUSE OF OPIATES: ICD-10-CM

## 2025-03-20 PROCEDURE — 0502F SUBSEQUENT PRENATAL CARE: CPT | Performed by: OBSTETRICS & GYNECOLOGY

## 2025-03-20 NOTE — PROGRESS NOTES
34 y.o.  at 38w3d EGA Estimated Date of Delivery: 3/31/25 here for NETTIE Visit:     Pt seen and examined. Reviewed all questions and concerns   Denies VB, LOF, CTX. Admits to normal fetal movement.   Denies fevers / chills / chest pain / shortness of breath.   Denies HA, changes with vision, RUQ pain, edema.   MWQ reviewed - doing well no concerns.     Pregnancy is complicated by 1) THC use, 2) Hx of opiate abuse, 3) nausea / emesis (improving), 4) Prior  x 2 both at [facility], 1st was forcep delivery. 5) hx of 2 Episiotomies, 6) G2, baby had a hematoma on skull after delivery.    GBS negative  3/6/25: EFW: 3355 g, 87.8%, ELOIAN 10.02 cm    Objective:  /64 (BP Site: Right Upper Arm, Patient Position: Sitting, BP Cuff Size: Medium Adult)   Pulse 90   Wt 73.2 kg (161 lb 6.4 oz)   LMP 2024   SpO2 97%   BMI 27.70 kg/m²   Gen: AO, NAD  Abd: Soft, NT, gravid    VE: 2/50/-3    GBS pending   Ext: Mild LE edema  OMM: Increased lumbar lordosis    Assessment/Plan:   Diagnosis Orders   1. Prenatal care, subsequent pregnancy in third trimester        2. Marijuana use during pregnancy        3. Hx of forceps delivery in prior pregnancy, currently pregnant, third trimester        4. History of episiotomy        5. At risk for abuse of opiates        6. Recently quit using tobacco        7. Nausea and vomiting in pregnancy            Prenatal Care, Subsequent pregnancy   Reassuring fetal / maternal status today  Aneuploidy / Carrier Screen:  Low Risk, Female   AFP: declined    PNL: A(+)/ab(-), RI, HepBnr, HepCnr, HIVnr, RPRnr, Varicella Imm, TSH 1.19, Hgb 12.3, Plt 282, UDS (+) THC, UCx neg, GCCT NEED      Anatomy: 24 normal female anatomy, vertex position, posterior placenta, Transvaginal cervical length is 5.17cm with no funneling noted.  28 wk: GCT  Hgb Plt } nml   Tdap: completed at 28 wks   Flu: declined   RSV: declined   GBS: neg  Birth Plan: hopeful for spontaneous labor, interested in epidural,

## 2025-03-27 ENCOUNTER — ANESTHESIA EVENT (OUTPATIENT)
Dept: LABOR AND DELIVERY | Age: 35
End: 2025-03-27
Payer: COMMERCIAL

## 2025-03-27 ENCOUNTER — ANESTHESIA (OUTPATIENT)
Dept: LABOR AND DELIVERY | Age: 35
End: 2025-03-27
Payer: COMMERCIAL

## 2025-03-27 ENCOUNTER — APPOINTMENT (OUTPATIENT)
Dept: LABOR AND DELIVERY | Age: 35
End: 2025-03-27
Payer: COMMERCIAL

## 2025-03-27 ENCOUNTER — HOSPITAL ENCOUNTER (INPATIENT)
Age: 35
LOS: 1 days | Discharge: HOME | End: 2025-03-28
Attending: OBSTETRICS & GYNECOLOGY | Admitting: OBSTETRICS & GYNECOLOGY
Payer: COMMERCIAL

## 2025-03-27 PROBLEM — Z34.90 ENCOUNTER FOR INDUCTION OF LABOR: Status: ACTIVE | Noted: 2025-03-27

## 2025-03-27 LAB
ABO, EXTERNAL RESULT: NORMAL
ABO/RH: NORMAL
AMPHETAMINES UR QL SCN>1000 NG/ML: ABNORMAL
ANTIBODY SCREEN: NORMAL
BARBITURATES UR QL SCN>200 NG/ML: ABNORMAL
BASOPHILS # BLD: 0.1 K/UL (ref 0–0.2)
BASOPHILS NFR BLD: 0.6 %
BENZODIAZ UR QL SCN>200 NG/ML: ABNORMAL
BUPRENORPHINE+NOR UR QL SCN: ABNORMAL
CANNABINOIDS UR QL SCN>50 NG/ML: POSITIVE
COCAINE UR QL SCN: ABNORMAL
DEPRECATED RDW RBC AUTO: 13.4 % (ref 12.4–15.4)
DRUG SCREEN COMMENT UR-IMP: ABNORMAL
EOSINOPHIL # BLD: 0.4 K/UL (ref 0–0.6)
EOSINOPHIL NFR BLD: 3.3 %
FENTANYL SCREEN, URINE: ABNORMAL
HCT VFR BLD AUTO: 39.3 % (ref 36–48)
HGB BLD-MCNC: 13.3 G/DL (ref 12–16)
LYMPHOCYTES # BLD: 1.4 K/UL (ref 1–5.1)
LYMPHOCYTES NFR BLD: 12.2 %
MCH RBC QN AUTO: 30 PG (ref 26–34)
MCHC RBC AUTO-ENTMCNC: 33.9 G/DL (ref 31–36)
MCV RBC AUTO: 88.5 FL (ref 80–100)
METHADONE UR QL SCN>300 NG/ML: ABNORMAL
MONOCYTES # BLD: 0.6 K/UL (ref 0–1.3)
MONOCYTES NFR BLD: 5.1 %
NEUTROPHILS # BLD: 9 K/UL (ref 1.7–7.7)
NEUTROPHILS NFR BLD: 78.8 %
OPIATES UR QL SCN>300 NG/ML: ABNORMAL
OXYCODONE UR QL SCN: ABNORMAL
PCP UR QL SCN>25 NG/ML: ABNORMAL
PH UR STRIP: 6 [PH]
PLATELET # BLD AUTO: 230 K/UL (ref 135–450)
PMV BLD AUTO: 7.8 FL (ref 5–10.5)
RBC # BLD AUTO: 4.44 M/UL (ref 4–5.2)
RH FACTOR, EXTERNAL RESULT: POSITIVE
WBC # BLD AUTO: 11.4 K/UL (ref 4–11)

## 2025-03-27 PROCEDURE — 1220000000 HC SEMI PRIVATE OB R&B

## 2025-03-27 PROCEDURE — 2580000003 HC RX 258: Performed by: OBSTETRICS & GYNECOLOGY

## 2025-03-27 PROCEDURE — 85025 COMPLETE CBC W/AUTO DIFF WBC: CPT

## 2025-03-27 PROCEDURE — 86850 RBC ANTIBODY SCREEN: CPT

## 2025-03-27 PROCEDURE — 86780 TREPONEMA PALLIDUM: CPT

## 2025-03-27 PROCEDURE — 59400 OBSTETRICAL CARE: CPT | Performed by: OBSTETRICS & GYNECOLOGY

## 2025-03-27 PROCEDURE — 3700000025 EPIDURAL BLOCK: Performed by: ANESTHESIOLOGY

## 2025-03-27 PROCEDURE — 51701 INSERT BLADDER CATHETER: CPT

## 2025-03-27 PROCEDURE — 2500000003 HC RX 250 WO HCPCS: Performed by: NURSE ANESTHETIST, CERTIFIED REGISTERED

## 2025-03-27 PROCEDURE — 6370000000 HC RX 637 (ALT 250 FOR IP): Performed by: OBSTETRICS & GYNECOLOGY

## 2025-03-27 PROCEDURE — 3E033VJ INTRODUCTION OF OTHER HORMONE INTO PERIPHERAL VEIN, PERCUTANEOUS APPROACH: ICD-10-PCS | Performed by: OBSTETRICS & GYNECOLOGY

## 2025-03-27 PROCEDURE — 10907ZC DRAINAGE OF AMNIOTIC FLUID, THERAPEUTIC FROM PRODUCTS OF CONCEPTION, VIA NATURAL OR ARTIFICIAL OPENING: ICD-10-PCS | Performed by: OBSTETRICS & GYNECOLOGY

## 2025-03-27 PROCEDURE — 6360000002 HC RX W HCPCS: Performed by: OBSTETRICS & GYNECOLOGY

## 2025-03-27 PROCEDURE — 7200000001 HC VAGINAL DELIVERY

## 2025-03-27 PROCEDURE — 86901 BLOOD TYPING SEROLOGIC RH(D): CPT

## 2025-03-27 PROCEDURE — 80307 DRUG TEST PRSMV CHEM ANLYZR: CPT

## 2025-03-27 PROCEDURE — 86900 BLOOD TYPING SEROLOGIC ABO: CPT

## 2025-03-27 PROCEDURE — 0HQ9XZZ REPAIR PERINEUM SKIN, EXTERNAL APPROACH: ICD-10-PCS | Performed by: OBSTETRICS & GYNECOLOGY

## 2025-03-27 PROCEDURE — 6360000002 HC RX W HCPCS: Performed by: NURSE ANESTHETIST, CERTIFIED REGISTERED

## 2025-03-27 RX ORDER — BUPIVACAINE HYDROCHLORIDE 2.5 MG/ML
INJECTION, SOLUTION EPIDURAL; INFILTRATION; INTRACAUDAL; PERINEURAL
Status: DISCONTINUED | OUTPATIENT
Start: 2025-03-27 | End: 2025-03-27 | Stop reason: SDUPTHER

## 2025-03-27 RX ORDER — CARBOPROST TROMETHAMINE 250 UG/ML
250 INJECTION, SOLUTION INTRAMUSCULAR PRN
Status: DISCONTINUED | OUTPATIENT
Start: 2025-03-27 | End: 2025-03-27

## 2025-03-27 RX ORDER — ACETAMINOPHEN 325 MG/1
650 TABLET ORAL EVERY 4 HOURS PRN
Status: DISCONTINUED | OUTPATIENT
Start: 2025-03-27 | End: 2025-03-27

## 2025-03-27 RX ORDER — ACETAMINOPHEN 500 MG
1000 TABLET ORAL EVERY 8 HOURS SCHEDULED
Status: DISCONTINUED | OUTPATIENT
Start: 2025-03-27 | End: 2025-03-28 | Stop reason: HOSPADM

## 2025-03-27 RX ORDER — MISOPROSTOL 100 UG/1
400 TABLET ORAL PRN
Status: DISCONTINUED | OUTPATIENT
Start: 2025-03-27 | End: 2025-03-27

## 2025-03-27 RX ORDER — TRANEXAMIC ACID 10 MG/ML
1000 INJECTION, SOLUTION INTRAVENOUS
Status: DISCONTINUED | OUTPATIENT
Start: 2025-03-27 | End: 2025-03-27

## 2025-03-27 RX ORDER — SODIUM CHLORIDE, SODIUM LACTATE, POTASSIUM CHLORIDE, AND CALCIUM CHLORIDE .6; .31; .03; .02 G/100ML; G/100ML; G/100ML; G/100ML
500 INJECTION, SOLUTION INTRAVENOUS PRN
Status: DISCONTINUED | OUTPATIENT
Start: 2025-03-27 | End: 2025-03-27

## 2025-03-27 RX ORDER — SODIUM CHLORIDE 9 MG/ML
25 INJECTION, SOLUTION INTRAVENOUS PRN
Status: DISCONTINUED | OUTPATIENT
Start: 2025-03-27 | End: 2025-03-27

## 2025-03-27 RX ORDER — FERROUS SULFATE 325(65) MG
325 TABLET ORAL EVERY OTHER DAY
Status: DISCONTINUED | OUTPATIENT
Start: 2025-03-27 | End: 2025-03-28 | Stop reason: HOSPADM

## 2025-03-27 RX ORDER — METHYLERGONOVINE MALEATE 0.2 MG/ML
200 INJECTION INTRAVENOUS PRN
Status: DISCONTINUED | OUTPATIENT
Start: 2025-03-27 | End: 2025-03-27

## 2025-03-27 RX ORDER — SODIUM CHLORIDE 0.9 % (FLUSH) 0.9 %
5-40 SYRINGE (ML) INJECTION PRN
Status: DISCONTINUED | OUTPATIENT
Start: 2025-03-27 | End: 2025-03-27

## 2025-03-27 RX ORDER — ONDANSETRON 4 MG/1
4 TABLET, ORALLY DISINTEGRATING ORAL EVERY 6 HOURS PRN
Status: DISCONTINUED | OUTPATIENT
Start: 2025-03-27 | End: 2025-03-27

## 2025-03-27 RX ORDER — SODIUM CHLORIDE, SODIUM LACTATE, POTASSIUM CHLORIDE, CALCIUM CHLORIDE 600; 310; 30; 20 MG/100ML; MG/100ML; MG/100ML; MG/100ML
INJECTION, SOLUTION INTRAVENOUS CONTINUOUS
Status: DISCONTINUED | OUTPATIENT
Start: 2025-03-27 | End: 2025-03-27

## 2025-03-27 RX ORDER — ONDANSETRON 2 MG/ML
4 INJECTION INTRAMUSCULAR; INTRAVENOUS EVERY 6 HOURS PRN
Status: DISCONTINUED | OUTPATIENT
Start: 2025-03-27 | End: 2025-03-28 | Stop reason: HOSPADM

## 2025-03-27 RX ORDER — ONDANSETRON 4 MG/1
4 TABLET, ORALLY DISINTEGRATING ORAL EVERY 6 HOURS PRN
Status: DISCONTINUED | OUTPATIENT
Start: 2025-03-27 | End: 2025-03-28 | Stop reason: HOSPADM

## 2025-03-27 RX ORDER — SODIUM CHLORIDE 0.9 % (FLUSH) 0.9 %
5-40 SYRINGE (ML) INJECTION EVERY 12 HOURS SCHEDULED
Status: DISCONTINUED | OUTPATIENT
Start: 2025-03-27 | End: 2025-03-28 | Stop reason: HOSPADM

## 2025-03-27 RX ORDER — IBUPROFEN 800 MG/1
800 TABLET, FILM COATED ORAL EVERY 8 HOURS SCHEDULED
Status: DISCONTINUED | OUTPATIENT
Start: 2025-03-27 | End: 2025-03-28 | Stop reason: HOSPADM

## 2025-03-27 RX ORDER — SODIUM CHLORIDE 9 MG/ML
INJECTION, SOLUTION INTRAVENOUS PRN
Status: DISCONTINUED | OUTPATIENT
Start: 2025-03-27 | End: 2025-03-28 | Stop reason: HOSPADM

## 2025-03-27 RX ORDER — DOCUSATE SODIUM 100 MG/1
100 CAPSULE, LIQUID FILLED ORAL 2 TIMES DAILY
Status: DISCONTINUED | OUTPATIENT
Start: 2025-03-27 | End: 2025-03-28 | Stop reason: HOSPADM

## 2025-03-27 RX ORDER — ONDANSETRON 2 MG/ML
4 INJECTION INTRAMUSCULAR; INTRAVENOUS EVERY 6 HOURS PRN
Status: DISCONTINUED | OUTPATIENT
Start: 2025-03-27 | End: 2025-03-27

## 2025-03-27 RX ORDER — SODIUM CHLORIDE 0.9 % (FLUSH) 0.9 %
5-40 SYRINGE (ML) INJECTION EVERY 12 HOURS SCHEDULED
Status: DISCONTINUED | OUTPATIENT
Start: 2025-03-27 | End: 2025-03-27

## 2025-03-27 RX ORDER — TERBUTALINE SULFATE 1 MG/ML
0.25 INJECTION SUBCUTANEOUS
Status: DISCONTINUED | OUTPATIENT
Start: 2025-03-27 | End: 2025-03-27

## 2025-03-27 RX ORDER — SODIUM CHLORIDE 0.9 % (FLUSH) 0.9 %
5-40 SYRINGE (ML) INJECTION PRN
Status: DISCONTINUED | OUTPATIENT
Start: 2025-03-27 | End: 2025-03-28 | Stop reason: HOSPADM

## 2025-03-27 RX ADMIN — Medication 87.3 MILLI-UNITS/MIN: at 19:21

## 2025-03-27 RX ADMIN — Medication 166.7 ML: at 19:07

## 2025-03-27 RX ADMIN — IBUPROFEN 800 MG: 800 TABLET, FILM COATED ORAL at 20:27

## 2025-03-27 RX ADMIN — Medication 1 MILLI-UNITS/MIN: at 09:30

## 2025-03-27 RX ADMIN — SODIUM CHLORIDE, SODIUM LACTATE, POTASSIUM CHLORIDE, AND CALCIUM CHLORIDE: .6; .31; .03; .02 INJECTION, SOLUTION INTRAVENOUS at 13:23

## 2025-03-27 RX ADMIN — Medication 1 MILLI-UNITS/MIN: at 09:33

## 2025-03-27 RX ADMIN — DOCUSATE SODIUM 100 MG: 100 CAPSULE, LIQUID FILLED ORAL at 20:27

## 2025-03-27 RX ADMIN — Medication 15 ML/HR: at 13:38

## 2025-03-27 RX ADMIN — SODIUM CHLORIDE, SODIUM LACTATE, POTASSIUM CHLORIDE, AND CALCIUM CHLORIDE 500 ML: .6; .31; .03; .02 INJECTION, SOLUTION INTRAVENOUS at 12:29

## 2025-03-27 RX ADMIN — BUPIVACAINE HYDROCHLORIDE 1 ML: 2.5 INJECTION, SOLUTION EPIDURAL; INFILTRATION; INTRACAUDAL; PERINEURAL at 13:29

## 2025-03-27 RX ADMIN — SODIUM CHLORIDE, SODIUM LACTATE, POTASSIUM CHLORIDE, AND CALCIUM CHLORIDE: .6; .31; .03; .02 INJECTION, SOLUTION INTRAVENOUS at 08:45

## 2025-03-27 ASSESSMENT — PAIN SCALES - GENERAL: PAINLEVEL_OUTOF10: 0

## 2025-03-27 NOTE — ANESTHESIA PROCEDURE NOTES
Epidural Block    Patient location during procedure: OB  Reason for block: labor epidural  Staffing  Resident/CRNA: Carolyn Dobbins APRN - CRNA  Performed by: Carolyn Dobbins APRN - CRNA  Authorized by: Mayank Arvizu MD    Epidural  Patient position: sitting  Prep: ChloraPrep and site prepped and draped  Patient monitoring: continuous pulse ox and frequent blood pressure checks  Approach: midline  Location: L3-4  Injection technique: YOJANA saline  Provider prep: sterile gloves and mask  Needle  Needle type: Tuohy   Needle gauge: 17 G  Needle length: 3.5 in  Needle insertion depth: 5 cm  Catheter type: side hole  Catheter size: 19 G  Catheter at skin depth: 11 cm  Test dose: negativeCatheter Secured: tegaderm  Assessment  Hemodynamics: stable  Attempts: 1  Outcomes: uncomplicated and patient tolerated procedure well  Additional Notes  Pt prepped and draped in sterile fashion.  Skin wheal with 1% lidocaine.  YOJANA with 3 cc NS, 25g spinal needle inserted per rayray.  Clear CSF visualized in hub.  1 mL 0.25% Bupivacaine injected.  Needle withdrawn and catheter threaded.  Negative test dose 3cc 1.5% Lidocaine with Epinephrine.  Sterile dressing applied.   Preanesthetic Checklist  Completed: patient identified, IV checked, site marked, risks and benefits discussed, surgical/procedural consents, equipment checked, pre-op evaluation, timeout performed, anesthesia consent given, oxygen available, monitors applied/VS acknowledged and blood product R/B/A discussed and consented

## 2025-03-27 NOTE — L&D DELIVERY SUMMARY NOTE
[facility] Obstetrics and Gynecology  Spontaneous Vaginal Delivery Note        Pre-operative Diagnosis:    34 y.o.  at 39w3d EGA   A+ / RI /  GBS -   Hx of THC use       Post-operative Diagnosis:     Same             Anesthesia:  Epidural       Admission and Delivery:       Patient presented to [facility] Labor and Delivery for EIOL.  She received IV Pitocin / AROM and Epidural for comfort.      Patient progressed spontaneously to complete cervical dilation.  Began pushing and with good maternal effort.  The infant was delivered in the KERI position over intact perineum. Nuchal cord absent but cord was draped over shoulders -- reduced at perineum. The shoulders and body were quickly to follow with maternal efforts. Infant was delivered with good tone and spontaneous cry without complication. Mouth and nose were bulb suctioned at maternal abdomen. Delayed cord clamping, partner cut cord after 1 min as cord no longer pulsating. Cord segment and cord blood were obtained.  Cord gasses were collected, however due to fetal wellbeing were discarded.  APGARS were noted to be 8 and 9. Delivery Date and Time: 3/27/2025 1843 .      The placenta was delivered spontaneously with manual massage of the uterus and was noted to be intact with a 3 vessel cord. Pitocin was started immediately after placenta delivery. Bleeding noted to be appropriate.     1st degree laceration was noted and repaired with 2-0 Vicryl.      The patient tolerated well and is in stable condition following delivery.      EBL: 200 ml     Infant Wt: Pending     APGARS: 8, 9      Specimen:  Placenta / Cord segment      Condition:  infant stable and mother stable      Attending Attestation: I performed the procedure.      Izabella Barcenas DO

## 2025-03-27 NOTE — H&P
Obstetrics History and Physical    HPI: Jennifer Fuchs is a 34 y.o.  at 39w3d who presents for scheduled induction of labor. Denies vaginal bleeding. Denies leaking fluid. Denies contractions. Endorses fetal movement.    Pregnancy has been complicated by 1) THC use, 2) Hx of opiate abuse, 3) nausea / emesis (improving), 4) Prior  x 2 both at [facility], 1st was forcep delivery. 5) hx of 2 Episiotomies, 6) G2, baby had a hematoma on skull after delivery.      Prenatal Flow:   Aneuploidy / Carrier Screen:  Low Risk, Female   AFP: declined    PNL: A(+)/ab(-), RI, HepBnr, HepCnr, HIVnr, RPRnr, Varicella Imm, TSH 1.19, Hgb 12.3, Plt 282, UDS (+) THC, UCx neg, GCCT NEED      Anatomy: 24 normal female anatomy, vertex position, posterior placenta, Transvaginal cervical length is 5.17cm with no funneling noted.  28 wk: GCT  Hgb Plt } nml   Tdap: completed at 28 wks   Flu: declined   RSV: declined   GBS: neg  Birth Plan: hopeful for spontaneous labor, interested in epidural, would like to avoid episiotomy if able   Scheduled IOL for 3/27/25 at 0800 for Pit/AROM (39+4 EGA)Narinder    Breastfeeding Education: reviewed   3/6/25: EFW: 3355 g, 87.8%, ELOINA 10.02 cm     Review of Systems: The following ROS was otherwise negative, except as noted in the HPI: constitutional, HEENT, respiratory, cardiovascular, gastrointestinal, genitourinary, skin, musculoskeletal, neurological, psych    OBGYN Provider : Izabella Barcenas DO    Obstetrical History:  OB History    Para Term  AB Living   4 2 2 0 1 2   SAB IAB Ectopic Molar Multiple Live Births   0 0 0 0 0 2      # Outcome Date GA Lbr Jerod/2nd Weight Sex Type Anes PTL Lv   4 Current            3 Term 19   3.997 kg (8 lb 13 oz) F Vag-Spont   ADALID   2 Term 18   3.345 kg (7 lb 6 oz) M Vag-Spont   ADALID   1 AB                Past Medical History:   Past Medical History:   Diagnosis Date    Allergic rhinitis     Anemia     Depression     Dizziness

## 2025-03-27 NOTE — PLAN OF CARE
Problem: Safety - Adult  Goal: Free from fall injury  Outcome: Progressing     Problem: Vaginal Birth or  Section  Goal: Fetal and maternal status remain reassuring during the birth process  Description:  Birth OB-Pregnancy care plan goal which identifies if the fetal and maternal status remain reassuring during the birth process  Outcome: Progressing     Problem: Pain  Goal: Verbalizes/displays adequate comfort level or baseline comfort level  Outcome: Progressing

## 2025-03-27 NOTE — PLAN OF CARE
Problem: Safety - Adult  Goal: Free from fall injury  3/27/2025 0944 by Edith Wolfe RN  Outcome: Progressing  3/27/2025 0943 by Ediht Wolfe RN  Outcome: Progressing     Problem: Vaginal Birth or  Section  Goal: Fetal and maternal status remain reassuring during the birth process  Description:  Birth OB-Pregnancy care plan goal which identifies if the fetal and maternal status remain reassuring during the birth process  3/27/2025 0944 by Edith Wolfe RN  Outcome: Progressing  3/27/2025 0943 by Edith Wolfe RN  Outcome: Progressing     Problem: Pain  Goal: Verbalizes/displays adequate comfort level or baseline comfort level  3/27/2025 0944 by Edith Wolfe RN  Outcome: Progressing  3/27/2025 0943 by Edith Wolfe RN  Outcome: Progressing

## 2025-03-27 NOTE — ANESTHESIA PRE PROCEDURE
Department of Anesthesiology  Preprocedure Note       Name:  Jennifer Fuchs   Age:  34 y.o.  :  1990                                          MRN:  [id number]         Date:  3/27/2025      Surgeon: * No surgeons listed *    Procedure: * No procedures listed *    Medications prior to admission:   Prior to Admission medications    Medication Sig Start Date End Date Taking? Authorizing Provider   Prenatal MV-Min-Fe Fum-FA-DHA (PRENATAL 1 PO) Take by mouth   Yes Provider, MD Kana       Current medications:    Current Facility-Administered Medications   Medication Dose Route Frequency Provider Last Rate Last Admin    oxytocin (PITOCIN) 30 units in 500 mL infusion  1 dilcia-units/min IntraVENous Continuous [place], Izabella L, DO 4 mL/hr at 25 1230 4 dilcia-units/min at 25 1230    terbutaline (BRETHINE) injection 0.25 mg  0.25 mg SubCUTAneous Once PRN Narinder, Izabella L, DO        lactated ringers infusion   IntraVENous Continuous Narinder, Izabella L,  mL/hr at 25 0845 New Bag at 25 0845    lactated ringers bolus 500 mL  500 mL IntraVENous PRN [place], Izabella L, DO        Or    lactated ringers bolus 500 mL  500 mL IntraVENous PRN Narinder, Izabella L,  mL/hr at 25 1229 500 mL at 25 1229    sodium chloride flush 0.9 % injection 5-40 mL  5-40 mL IntraVENous 2 times per day Narinder, Izabella L, DO        sodium chloride flush 0.9 % injection 5-40 mL  5-40 mL IntraVENous PRN Narinder, Izabella L, DO        0.9 % sodium chloride infusion  25 mL IntraVENous PRN [place], Izabella L, DO        ondansetron (ZOFRAN) injection 4 mg  4 mg IntraVENous Q6H PRN [place], Izabella L, DO        Or    ondansetron (ZOFRAN-ODT) disintegrating tablet 4 mg  4 mg Oral Q6H PRN Narinder, Izabella L, DO        oxytocin (PITOCIN) 30 units in 500 mL infusion  87.3 dilcia-units/min IntraVENous Continuous PRN Narinder, Izabella L, DO        And    oxytocin (PITOCIN) 10 unit bolus from the bag

## 2025-03-28 VITALS
DIASTOLIC BLOOD PRESSURE: 73 MMHG | OXYGEN SATURATION: 97 % | WEIGHT: 162 LBS | TEMPERATURE: 98.3 F | SYSTOLIC BLOOD PRESSURE: 122 MMHG | HEIGHT: 64 IN | HEART RATE: 75 BPM | RESPIRATION RATE: 16 BRPM | BODY MASS INDEX: 27.66 KG/M2

## 2025-03-28 LAB
HCT VFR BLD AUTO: 35.3 % (ref 36–48)
HGB BLD-MCNC: 12.1 G/DL (ref 12–16)
REAGIN+T PALLIDUM IGG+IGM SERPL-IMP: NORMAL

## 2025-03-28 PROCEDURE — 6370000000 HC RX 637 (ALT 250 FOR IP): Performed by: OBSTETRICS & GYNECOLOGY

## 2025-03-28 PROCEDURE — 85018 HEMOGLOBIN: CPT

## 2025-03-28 PROCEDURE — 36415 COLL VENOUS BLD VENIPUNCTURE: CPT

## 2025-03-28 PROCEDURE — 85014 HEMATOCRIT: CPT

## 2025-03-28 PROCEDURE — 99024 POSTOP FOLLOW-UP VISIT: CPT | Performed by: OBSTETRICS & GYNECOLOGY

## 2025-03-28 PROCEDURE — 2500000003 HC RX 250 WO HCPCS: Performed by: OBSTETRICS & GYNECOLOGY

## 2025-03-28 RX ORDER — PSEUDOEPHEDRINE HCL 30 MG
100 TABLET ORAL 2 TIMES DAILY PRN
Qty: 30 CAPSULE | Refills: 1 | Status: SHIPPED | OUTPATIENT
Start: 2025-03-28

## 2025-03-28 RX ORDER — IBUPROFEN 800 MG/1
800 TABLET, FILM COATED ORAL EVERY 8 HOURS PRN
Qty: 40 TABLET | Refills: 2 | Status: SHIPPED | OUTPATIENT
Start: 2025-03-28

## 2025-03-28 RX ADMIN — Medication 10 ML: at 08:38

## 2025-03-28 RX ADMIN — IBUPROFEN 800 MG: 800 TABLET, FILM COATED ORAL at 14:47

## 2025-03-28 RX ADMIN — ACETAMINOPHEN 1000 MG: 500 TABLET ORAL at 17:11

## 2025-03-28 RX ADMIN — ACETAMINOPHEN 1000 MG: 500 TABLET ORAL at 08:37

## 2025-03-28 RX ADMIN — IBUPROFEN 800 MG: 800 TABLET, FILM COATED ORAL at 04:02

## 2025-03-28 RX ADMIN — ACETAMINOPHEN 1000 MG: 500 TABLET ORAL at 00:48

## 2025-03-28 RX ADMIN — DOCUSATE SODIUM 100 MG: 100 CAPSULE, LIQUID FILLED ORAL at 08:37

## 2025-03-28 ASSESSMENT — PAIN - FUNCTIONAL ASSESSMENT: PAIN_FUNCTIONAL_ASSESSMENT: ACTIVITIES ARE NOT PREVENTED

## 2025-03-28 ASSESSMENT — PAIN DESCRIPTION - LOCATION
LOCATION: ABDOMEN

## 2025-03-28 ASSESSMENT — PAIN DESCRIPTION - DESCRIPTORS
DESCRIPTORS: CRAMPING
DESCRIPTORS: CRAMPING
DESCRIPTORS: DISCOMFORT;CRAMPING

## 2025-03-28 ASSESSMENT — PAIN SCALES - GENERAL
PAINLEVEL_OUTOF10: 1
PAINLEVEL_OUTOF10: 0
PAINLEVEL_OUTOF10: 1
PAINLEVEL_OUTOF10: 2
PAINLEVEL_OUTOF10: 1

## 2025-03-28 ASSESSMENT — PAIN DESCRIPTION - ORIENTATION: ORIENTATION: LOWER

## 2025-03-28 NOTE — PROGRESS NOTES
Bladder scanner used r/t unable to void. 511mls in bladder. Patient will attempt to void one more time otherwise will straight cath.

## 2025-03-28 NOTE — PROGRESS NOTES
Report received from LAWRENCE Vergara RN. Bedside report given. Introduced myself to pt as her RN for the day. I put my name and phone number on the white board and showed pt how to use her room phone to get a hold of me. Pt was given her plan of care for the day. Call light within reach. Bed in lowest position and wheels are locked. Pt verbalized understanding and denies any further needs at this time.

## 2025-03-28 NOTE — DISCHARGE INSTRUCTIONS
Thank you for the opportunity to care for you and your family.    We hope that you are happy with the care we provided during your stay in the [facility]. We want to ensure that you have the help you need when you leave the hospital. If there is anything we can assist you with, please let us know.    Breastfeeding mothers may contact our lactation specialists with any problems or questions.  [facility] lactation services phone number is [phone].  Leave a message and your call will be returned.    Please refer to the information provided in the postpartum care booklet.  The following are warning signs to remember.    Call 911 if you have:    Chest pain or pressure  Shortness of breath, even at rest  Thoughts of harming yourself or others  Seizures    Call your healthcare provider if you have:    Temperature of 100.4 degrees or higher  Stitches that are not healing        -- Swelling, bleeding, drainage, foul odor, redness or warmth in/around your           stitches, staples, or incision (scar)        -- Bad smelling blood or discharge from the vagina  Vaginal bleeding that has increased         -- Soaking through one pad in an hour        -- You are passing clots larger than the size of a lemon  Red, warm tender area(s) in your breast or calf  Headache that does not get better, even after taking medicine; or headache with vision changes    Remember to notify all healthcare providers from your date of delivery to up to one year after giving birth!    CARING FOR YOURSELF        DIET/ACTIVITY    Eat a well balanced diet focusing on foods high in fiber and protein.  Drink plenty of fluids, especially water.  To avoid constipation you may take a mild stool softener as recommended by your doctor or midwife.  Gradually increase your activity. Resume an exercise regime only after being advised by your doctor or midwife.  When sitting or lying down, keep your legs elevated to reduce swelling.  Avoid

## 2025-03-28 NOTE — FLOWSHEET NOTE
Postpartum teaching completed and copy of given to patient. Patient verbalized understanding of all teaching points.  All questions answered. Prescriptions given. Patient plans to follow-up with OB Provider as instructed. Patient discharged in wheelchair in stable condition accompanied by family/guardian.

## 2025-03-28 NOTE — PLAN OF CARE
Problem: Safety - Adult  Goal: Free from fall injury  3/28/2025 1900 by Rhonda Guzman RN  Outcome: Adequate for Discharge     Problem: Pain  Goal: Verbalizes/displays adequate comfort level or baseline comfort level  3/28/2025 1900 by Rhonda Guzman RN  Outcome: Adequate for Discharge     Problem: Postpartum  Goal: Experiences normal postpartum course  Description:  Postpartum OB-Pregnancy care plan goal which identifies if the mother is experiencing a normal postpartum course  3/28/2025 1900 by Rhonda Guzman RN  Outcome: Adequate for Discharge     Problem: Postpartum  Goal: Appropriate maternal -  bonding  Description:  Postpartum OB-Pregnancy care plan goal which identifies if the mother and  are bonding appropriately  3/28/2025 1900 by Rhonda Guzman RN  Outcome: Adequate for Discharge     Problem: Postpartum  Goal: Establishment of infant feeding pattern  Description:  Postpartum OB-Pregnancy care plan goal which identifies if the mother is establishing a feeding pattern with their   3/28/2025 1900 by Rhonda Guzman RN  Outcome: Adequate for Discharge     Problem: Postpartum  Goal: Incisions, wounds, or drain sites healing without S/S of infection  3/28/2025 1900 by Rhonda Guzman RN  Outcome: Adequate for Discharge     Problem: Discharge Planning  Goal: Discharge to home or other facility with appropriate resources  3/28/2025 1900 by Rhonda Guzman RN  Outcome: Adequate for Discharge     Problem: Chronic Conditions and Co-morbidities  Goal: Patient's chronic conditions and co-morbidity symptoms are monitored and maintained or improved  3/28/2025 1900 by Rhonda Guzman RN  Outcome: Adequate for Discharge

## 2025-03-28 NOTE — PROGRESS NOTES
Post Partum Progress Note    Subjective:  Jennifer Fuchs is a 34 y.o.  status-post  uncomplicated Vaginal Delivery. The pregnancy was complicated by  THC use, h/o opiate abuse, nausea/emesis, h/o forceps delivery .     Patient is doing well with no concerns.  Pain is well controlled with current regimen.  Lochia mild.  Tolerating regular diet without difficulty.  Ambulating without difficulty, denies dizziness on standing.  Voiding without difficulty.  She is bottle feeding.  Denies chest pain, SOB, or leg pain.    Objective:  /64   Pulse 71   Temp 98 °F (36.7 °C) (Oral)   Resp 16   Ht 1.626 m (5' 4\")   Wt 73.5 kg (162 lb)   LMP 2024   SpO2 98%   Breastfeeding Unknown   BMI 27.81 kg/m²     GENERAL APPEARANCE: alert, well appearing  LUNGS: nonlabored respirations  HEART: well perfused  ABDOMEN POSTPARTUM: soft, nontender, fundus firm  EXTREMITIES: no redness or tenderness in the calves or thighs, no edema    I/O last 3 completed shifts:  In: -   Out: 1035 [Urine:750; Blood:285]    Pertinent Labs:   CBC:   Recent Labs     25  0845 25  0556   WBC 11.4*  --    HGB 13.3 12.1   HCT 39.3 35.3*     --      BMP:  No results for input(s): \"NA\", \"K\", \"CL\", \"CO2\", \"BUN\", \"CREATININE\", \"GLUCOSE\" in the last 72 hours.  Hepatic: No results for input(s): \"AST\", \"ALT\", \"BILITOT\", \"ALKPHOS\" in the last 72 hours.    Invalid input(s): \"ALB\"  Mag:    No results for input(s): \"MG\" in the last 72 hours.   Phos:   No results for input(s): \"PHOS\" in the last 72 hours.   INR: No results for input(s): \"INR\" in the last 72 hours.    Assessment/Plan:  Jennifer Fuchs is a 34 y.o.  status-post  uncomplicated Vaginal Delivery.     1. PPD #1: doing well, routine care    2. Infant: F, , 3646g    3. Labs: A+, Ri, GBS neg    4. H/o THC use  - UDS+ on admission    Dispo: d/c home today pending infant status    Tabby Lagos MD  [facility]

## 2025-03-28 NOTE — PROGRESS NOTES
Discharge Phone Call    Patient Name: Jennifer Fuchs     OB Care Provider: Izabella Barcenas DO Discharge Date: 3/28/2025    Disposition of baby:    Phone Number: [phone] (home)     Attempts to Contact:  Date:    Caller  Date:    Caller  Date:    Caller    Information for the patient's :  Kb Fuchs [[id number]]   Delivery Method: Vaginal, Spontaneous    1.  Now that you are at home is your pain being well controlled?   Y/N   If no, instruct to call       provider.      2. Are you breastfeeding?    Y/N    Do you need any extra support from our lactation staff?      Y/N    If yes, provide number for lactation.  3. Have you made or already had your first appointment with the baby's doctor? Y/N   If no, do      you know when to schedule it?  Y/N    4. Have you scheduled your follow-up appointment?  Y/N  If no, do you know when to schedule       it?    Y/N   If no, they can find it on printed discharge instructions.  5. Did staff discuss safe sleep during your stay? Y/N   6. Did we explain things in a way you could understand?  Y/N  7. Were we respectful of your preferences for labor and birth and include you in the plan of       care?  Y/N  If no, please explain _______________________________________________  8. Is there anyone in particular you would like to mention who provided care for you? _______      _________________________________________________________________________     9. Were you given a Post-Birth Warning Signs handout?  Y/N  Do you have it somewhere      easily accessible?  Y/N  If no, please send them a copy and ask them to put it somewhere      easily found.  10. Have you been crying excessively, having anger or mood swings that feel out of control, or       feel like you can't cope with caring for yourself or baby? Y/N   If yes, they may be showing       signs of postpartum depression and should call provider. There is also a        depression test on page C5 in

## 2025-03-28 NOTE — CARE COORDINATION
Social Work Consult/Assessment    Reason for Consult:+ THC   Electronic record reviewed:yes    Delivery information:baby girl \" Abi Fuchs\" 2025 39w3d Weight 8lb 0.6oz Vag-Spont apgar 8,9  Marital Status:    Mob's UDS on admission:+ THC   Infant's UDS/Cord tox:NA     Present in the room:MOB feeding baby  Living situation:MOB, FOB, baby, and siblings   Address and phone: [address] [place] [address] ph [phone]  Spouse or significant other:Cristo Fuchs [phone]  Children:Hai age 6  Katherine age 5   Nicole twins per MOB   Children's Protective Services involvement: prior re + THC with Katherine, case closed on 1st visit, no open case   Support system:adequate family supports   Domestic Violence:Denies   Mental Health:Depression. Prior on Zoloft, stopped after having Hai. Denies concerns with her mental health. MOB reports previously in counseling has coping skilled. FOB very supportive   Post Partum Depression: reviewed s/sx, aware at increased risk   Substance Abuse:Active THC use. MOB has no medical card. Plans to bottle feed. MOB reports daily use, plans to ct use. Reports help with mood and nausea while pregnant.  MOB reports reports during interview about 10 years ago had a six month sent of heroin abuse. No IV use, snorted. Did not enroll in any formal treatment programing got sober by changing living and social environment. Denies any concerns with sobriety   Social Assistance Programs: [facility] info provided [employer] NA   Medicaid NA  Supplies:reports has all needed supplies and supports   Every Child Succeeds:declined referral      Summary:Plan is for baby to discharge home with MOB when medically cleared. MOB reports having all needed supplies and supports. FOB home with other children. MOB with + UDS on admission.  Aware of mandated reporting denies concerns. Call placed to [place] CPS report intake number [id number]. NO barrier to dc from hospital. SW will sign off.Lexie

## 2025-03-28 NOTE — DISCHARGE SUMMARY
Department of Obstetrics and Gynecology  Postpartum Discharge Summary      Admit Date: 3/27/2025    Admit Diagnosis: Encounter for induction of labor [Z34.90]    Discharge Date: 25    Condition at Discharge: good    Discharge Diagnoses: spontaneous vaginal delivery    Discharge Disposition:  Home    Service: Obstetrics    Postpartum complications: none     Hospital Course: uncomplicated     Data:  Information for the patient's :  Kb Fuchs [[id number]]      Weight   Information for the patient's :  Kb Fuchs [[id number]]      Apgars   Information for the patient's :  Kb Fuchs [[id number]]       Disposition of Baby:  Home with mother pending peds eval this AM      Current Discharge Medication List        START taking these medications    Details   ibuprofen (ADVIL;MOTRIN) 800 MG tablet Take 1 tablet by mouth every 8 hours as needed for Pain  Qty: 40 tablet, Refills: 2      docusate sodium (COLACE, DULCOLAX) 100 MG CAPS Take 100 mg by mouth 2 times daily as needed for Constipation  Qty: 30 capsule, Refills: 1           CONTINUE these medications which have NOT CHANGED    Details   Prenatal MV-Min-Fe Fum-FA-DHA (PRENATAL 1 PO) Take by mouth             Electronically signed by Tabby Lagos MD on 3/28/2025 at 10:28 AM

## 2025-03-28 NOTE — PLAN OF CARE
Problem: Safety - Adult  Goal: Free from fall injury  3/28/2025 0940 by Virginia Garcia  Outcome: Progressing     Problem: Pain  Goal: Verbalizes/displays adequate comfort level or baseline comfort level  3/28/2025 0940 by Virginia Garcia  Outcome: Progressing  Flowsheets (Taken 3/28/2025 0841)  Verbalizes/displays adequate comfort level or baseline comfort level:   Encourage patient to monitor pain and request assistance   Assess pain using appropriate pain scale   Administer analgesics based on type and severity of pain and evaluate response   Implement non-pharmacological measures as appropriate and evaluate response     Problem: Postpartum  Goal: Experiences normal postpartum course  Description:  Postpartum OB-Pregnancy care plan goal which identifies if the mother is experiencing a normal postpartum course  3/28/2025 0940 by Virginia Garcia  Outcome: Progressing     Problem: Postpartum  Goal: Appropriate maternal -  bonding  Description:  Postpartum OB-Pregnancy care plan goal which identifies if the mother and  are bonding appropriately  3/28/2025 0940 by Virginia Garcia  Outcome: Progressing     Problem: Postpartum  Goal: Establishment of infant feeding pattern  Description:  Postpartum OB-Pregnancy care plan goal which identifies if the mother is establishing a feeding pattern with their   3/28/2025 0940 by Virginia Garcai  Outcome: Progressing     Problem: Postpartum  Goal: Incisions, wounds, or drain sites healing without S/S of infection  Outcome: Progressing  Flowsheets (Taken 3/28/2025 0841)  Incisions, Wounds, or Drain Sites Healing Without Sign and Symptoms of Infection:   TWICE DAILY: Assess and document skin integrity   TWICE DAILY: Assess and document dressing/incision, wound bed, drain sites and surrounding tissue     Problem: Discharge Planning  Goal: Discharge to home or other facility with appropriate resources  Outcome:

## 2025-03-28 NOTE — PROGRESS NOTES
Brief Note    In to see patient prior to discharge. EPDS score 8, answered \"hardly ever\" to questions 10. We discussed PPD, pt reports h/o this in G2 pregnancy however denies any signs/sx at this time. No SI/HI, declines meds and reports good family support as well. Return precautions discussed, encouraged pt to call with any concerns after discharge.    Tabby Lagos MD

## 2025-03-28 NOTE — PLAN OF CARE
Problem: Vaginal Birth or  Section  Goal: Fetal and maternal status remain reassuring during the birth process  Description:  Birth OB-Pregnancy care plan goal which identifies if the fetal and maternal status remain reassuring during the birth process  3/27/2025 2012 by Yanci Vergara RN  Outcome: Completed  3/27/2025 0944 by Edith Wolfe RN  Outcome: Progressing  3/27/2025 0943 by Edith Wolfe RN  Outcome: Progressing     Problem: Postpartum  Goal: Experiences normal postpartum course  Description:  Postpartum OB-Pregnancy care plan goal which identifies if the mother is experiencing a normal postpartum course  Outcome: Progressing  Goal: Appropriate maternal -  bonding  Description:  Postpartum OB-Pregnancy care plan goal which identifies if the mother and  are bonding appropriately  Outcome: Progressing  Goal: Establishment of infant feeding pattern  Description:  Postpartum OB-Pregnancy care plan goal which identifies if the mother is establishing a feeding pattern with their   Outcome: Progressing

## 2025-03-28 NOTE — ANESTHESIA POSTPROCEDURE EVALUATION
Department of Anesthesiology  Postprocedure Note    Patient: Jennifer Fuchs  MRN: [id number]  YOB: 1990  Date of evaluation: 3/28/2025    Procedure Summary       Date: 03/27/25 Room / Location:     Anesthesia Start: 1320 Anesthesia Stop: 1843    Procedure: Labor Analgesia Diagnosis:     Scheduled Providers:  Responsible Provider: Mayank Arvizu MD    Anesthesia Type: epidural ASA Status: 2 - Emergent            Anesthesia Type: No value filed.    Joann Phase I: Joann Score: 10    Joann Phase II:      Anesthesia Post Evaluation    Patient location during evaluation: bedside  Level of consciousness: awake and alert  Nausea & Vomiting: no nausea and no vomiting  Cardiovascular status: hemodynamically stable  Hydration status: stable  Pain management: adequate and satisfactory to patient        No notable events documented.

## 2025-04-14 ENCOUNTER — POSTPARTUM VISIT (OUTPATIENT)
Dept: OBGYN CLINIC | Age: 35
End: 2025-04-14

## 2025-04-14 VITALS
SYSTOLIC BLOOD PRESSURE: 112 MMHG | BODY MASS INDEX: 25.34 KG/M2 | WEIGHT: 147.6 LBS | OXYGEN SATURATION: 97 % | HEART RATE: 72 BPM | DIASTOLIC BLOOD PRESSURE: 74 MMHG

## 2025-04-14 DIAGNOSIS — O99.320 MARIJUANA USE DURING PREGNANCY: ICD-10-CM

## 2025-04-14 DIAGNOSIS — Z30.011 ENCOUNTER FOR INITIAL PRESCRIPTION OF CONTRACEPTIVE PILLS: ICD-10-CM

## 2025-04-14 DIAGNOSIS — F12.90 MARIJUANA USE DURING PREGNANCY: ICD-10-CM

## 2025-04-14 RX ORDER — NORGESTIMATE AND ETHINYL ESTRADIOL 0.25-0.035
1 KIT ORAL DAILY
Qty: 3 PACKET | Refills: 3 | Status: SHIPPED | OUTPATIENT
Start: 2025-04-14

## 2025-04-20 PROBLEM — Z30.011 ENCOUNTER FOR INITIAL PRESCRIPTION OF CONTRACEPTIVE PILLS: Status: ACTIVE | Noted: 2025-04-20

## 2025-05-21 ENCOUNTER — POSTPARTUM VISIT (OUTPATIENT)
Dept: OBGYN CLINIC | Age: 35
End: 2025-05-21

## 2025-05-21 VITALS
BODY MASS INDEX: 25.03 KG/M2 | WEIGHT: 145.8 LBS | SYSTOLIC BLOOD PRESSURE: 126 MMHG | HEART RATE: 88 BPM | OXYGEN SATURATION: 96 % | DIASTOLIC BLOOD PRESSURE: 83 MMHG

## 2025-05-21 DIAGNOSIS — O99.320 MARIJUANA USE DURING PREGNANCY: ICD-10-CM

## 2025-05-21 DIAGNOSIS — F12.90 MARIJUANA USE DURING PREGNANCY: ICD-10-CM

## 2025-05-21 PROCEDURE — 0503F POSTPARTUM CARE VISIT: CPT | Performed by: OBSTETRICS & GYNECOLOGY

## 2025-05-26 NOTE — PROGRESS NOTES
OB Postpartum Visit    HPI:   Patient is a 34 y.o.  s/p vaginal delivery on 3/27/25 here for her postpartum visit:     Pt doing well today. Bleeding no bleeding. Bowel function is normal. Bladder function is normal.   Patient is not sexually active.   Contraception method is abstinence. But has decided to bottle feed and is interested in OCPs   EDPS: negative.   Baby has been doing well without problems.   Baby is feeding bottle - Similac with iron.     Review of Systems - The following ROS was otherwise negative, except as noted in the HPI: constitutional, respiratory, cardiovascular, gastrointestinal, genitourinary    OB History  OB History    Para Term  AB Living   4 3 3  1 3   SAB IAB Ectopic Molar Multiple Live Births       0 3      # Outcome Date GA Lbr Jerod/2nd Weight Sex Type Anes PTL Lv   4 Term 25 39w3d 05:52 / 00:23 3.646 kg (8 lb 0.6 oz) F Vag-Spont EPI N ADALID   3 Term 19   3.997 kg (8 lb 13 oz) F Vag-Spont   ADALID   2 Term 18   3.345 kg (7 lb 6 oz) M Vag-Spont   ADALID   1 AB                Medications:  Current Outpatient Medications on File Prior to Visit   Medication Sig Dispense Refill    norgestimate-ethinyl estradiol (SPRINTEC 28) 0.25-35 MG-MCG per tablet Take 1 tablet by mouth daily 3 packet 3    ibuprofen (ADVIL;MOTRIN) 800 MG tablet Take 1 tablet by mouth every 8 hours as needed for Pain (Patient not taking: Reported on 2025) 40 tablet 2    docusate sodium (COLACE, DULCOLAX) 100 MG CAPS Take 100 mg by mouth 2 times daily as needed for Constipation (Patient not taking: Reported on 2025) 30 capsule 1    Prenatal MV-Min-Fe Fum-FA-DHA (PRENATAL 1 PO) Take by mouth (Patient not taking: Reported on 2025)       No current facility-administered medications on file prior to visit.        Objective:  /83 (BP Site: Right Upper Arm, Patient Position: Sitting, BP Cuff Size: Medium Adult)   Pulse 88   Wt 66.1 kg (145 lb 12.8 oz)   LMP 2024